# Patient Record
Sex: MALE | Race: WHITE | NOT HISPANIC OR LATINO | URBAN - METROPOLITAN AREA
[De-identification: names, ages, dates, MRNs, and addresses within clinical notes are randomized per-mention and may not be internally consistent; named-entity substitution may affect disease eponyms.]

---

## 2023-06-02 PROBLEM — M25.50 JOINT PAIN: Status: ACTIVE | Noted: 2023-06-02

## 2023-06-02 PROBLEM — D22.9 NEVUS: Status: ACTIVE | Noted: 2023-06-02

## 2023-06-02 PROBLEM — N52.9 ERECTILE DYSFUNCTION: Status: ACTIVE | Noted: 2023-06-02

## 2023-06-02 PROBLEM — L72.3 SEBACEOUS CYST: Status: ACTIVE | Noted: 2023-06-02

## 2023-06-02 PROBLEM — E78.5 HLD (HYPERLIPIDEMIA): Status: ACTIVE | Noted: 2023-06-02

## 2023-06-02 RX ORDER — SILDENAFIL 100 MG/1
TABLET, FILM COATED ORAL
COMMUNITY
Start: 2020-06-02 | End: 2023-06-05 | Stop reason: SDUPTHER

## 2023-06-05 ENCOUNTER — OFFICE VISIT (OUTPATIENT)
Dept: PRIMARY CARE | Facility: CLINIC | Age: 53
End: 2023-06-05
Payer: COMMERCIAL

## 2023-06-05 VITALS
SYSTOLIC BLOOD PRESSURE: 116 MMHG | DIASTOLIC BLOOD PRESSURE: 68 MMHG | TEMPERATURE: 97.5 F | WEIGHT: 184 LBS | RESPIRATION RATE: 16 BRPM | BODY MASS INDEX: 27.25 KG/M2 | HEART RATE: 72 BPM | HEIGHT: 69 IN

## 2023-06-05 DIAGNOSIS — Z00.00 ENCOUNTER FOR PREVENTIVE HEALTH EXAMINATION: Primary | ICD-10-CM

## 2023-06-05 DIAGNOSIS — N52.9 ERECTILE DYSFUNCTION, UNSPECIFIED ERECTILE DYSFUNCTION TYPE: ICD-10-CM

## 2023-06-05 DIAGNOSIS — E78.5 HYPERLIPIDEMIA, UNSPECIFIED HYPERLIPIDEMIA TYPE: ICD-10-CM

## 2023-06-05 DIAGNOSIS — Z12.5 PROSTATE CANCER SCREENING: ICD-10-CM

## 2023-06-05 PROCEDURE — 99396 PREV VISIT EST AGE 40-64: CPT | Performed by: FAMILY MEDICINE

## 2023-06-05 PROCEDURE — 90750 HZV VACC RECOMBINANT IM: CPT | Performed by: FAMILY MEDICINE

## 2023-06-05 PROCEDURE — 1036F TOBACCO NON-USER: CPT | Performed by: FAMILY MEDICINE

## 2023-06-05 PROCEDURE — 90471 IMMUNIZATION ADMIN: CPT | Performed by: FAMILY MEDICINE

## 2023-06-05 RX ORDER — SILDENAFIL 100 MG/1
100 TABLET, FILM COATED ORAL AS NEEDED
Qty: 30 TABLET | Refills: 3 | Status: SHIPPED | OUTPATIENT
Start: 2023-06-05 | End: 2023-06-09 | Stop reason: SDUPTHER

## 2023-06-05 ASSESSMENT — PROMIS GLOBAL HEALTH SCALE
RATE_SOCIAL_SATISFACTION: EXCELLENT
RATE_PHYSICAL_HEALTH: EXCELLENT
CARRYOUT_PHYSICAL_ACTIVITIES: COMPLETELY
RATE_MENTAL_HEALTH: EXCELLENT
RATE_QUALITY_OF_LIFE: EXCELLENT
RATE_GENERAL_HEALTH: EXCELLENT
RATE_AVERAGE_PAIN: 0
CARRYOUT_SOCIAL_ACTIVITIES: EXCELLENT
EMOTIONAL_PROBLEMS: NEVER

## 2023-06-05 ASSESSMENT — PATIENT HEALTH QUESTIONNAIRE - PHQ9
SUM OF ALL RESPONSES TO PHQ9 QUESTIONS 1 AND 2: 0
2. FEELING DOWN, DEPRESSED OR HOPELESS: NOT AT ALL
1. LITTLE INTEREST OR PLEASURE IN DOING THINGS: NOT AT ALL

## 2023-06-05 NOTE — PROGRESS NOTES
Keith Matos is a 52 y.o. male here today for   Chief Complaint   Patient presents with    Annual Exam        HPI   Patient is here for a periodic health exam.  I reviewed previous preventative health measures including screening tests, immunizations and labs.   Regular exercise.  No tobacco use.  ETOH - 1-2 drinks per week.    Surg Hx:  B knee arthroscopy.      Shigrix - never.  Tetanus last year.      Objective    Visit Vitals  /68   Pulse 72   Temp 36.4 °C (97.5 °F)   Resp 16     Body mass index is 27.17 kg/m².     Physical Exam  Vitals and nursing note reviewed.   Constitutional:       General: He is not in acute distress.     Appearance: Normal appearance.   HENT:      Head: Normocephalic and atraumatic.      Right Ear: Tympanic membrane, ear canal and external ear normal.      Left Ear: Tympanic membrane, ear canal and external ear normal.      Nose: Nose normal.      Mouth/Throat:      Mouth: Mucous membranes are moist.      Pharynx: Oropharynx is clear.   Eyes:      Extraocular Movements: Extraocular movements intact.      Conjunctiva/sclera: Conjunctivae normal.      Pupils: Pupils are equal, round, and reactive to light.   Cardiovascular:      Rate and Rhythm: Normal rate and regular rhythm.      Pulses: Normal pulses.      Heart sounds: Normal heart sounds. No murmur heard.     No friction rub. No gallop.   Pulmonary:      Effort: Pulmonary effort is normal. No respiratory distress.      Breath sounds: Normal breath sounds.   Abdominal:      General: Abdomen is flat. Bowel sounds are normal. There is no distension.      Palpations: Abdomen is soft.      Tenderness: There is no abdominal tenderness.   Musculoskeletal:         General: Normal range of motion.      Cervical back: Normal range of motion and neck supple.   Lymphadenopathy:      Cervical: No cervical adenopathy.   Skin:     General: Skin is warm and dry.      Findings: No lesion or rash.   Neurological:      General: No focal deficit  present.      Mental Status: He is alert. Mental status is at baseline.   Psychiatric:         Mood and Affect: Mood normal.         Behavior: Behavior normal.         Thought Content: Thought content normal.         Judgment: Judgment normal.            Assessment    1. Encounter for preventive health examination  Zoster vaccine, recombinant, adult (SHINGRIX), Comprehensive Metabolic Panel, CBC, Lipid Panel, Prostate Specific Antigen, Vitamin D, Total   Recommend regular exercise, balanced diet, regular dental exams, and healthy habits.  Appropriate labs ordered or reviewed.  His colon cancer screening is up-to-date.  He will receive Shingrix today and will set up a nurse visit in 2 to 6 months for the second immunization.  His other immunizations are up-to-date.  I recommend a yearly flu shot in the fall and I recommend a yearly wellness exam.     2. Erectile dysfunction, unspecified erectile dysfunction type  sildenafil (Viagra) 100 mg tablet   Condition well controlled.  No change in current treatment regimen.  Refill given of current medication.  Make a follow up appointment with me for recheck in 6 months.     3. Hyperlipidemia, unspecified hyperlipidemia type     Appropriate labs ordered or reviewed.     4. Prostate cancer screening  Prostate Specific Antigen   Appropriate labs ordered or reviewed.

## 2023-06-08 ENCOUNTER — LAB (OUTPATIENT)
Dept: LAB | Facility: LAB | Age: 53
End: 2023-06-08
Payer: COMMERCIAL

## 2023-06-08 ENCOUNTER — TELEPHONE (OUTPATIENT)
Dept: PRIMARY CARE | Facility: CLINIC | Age: 53
End: 2023-06-08

## 2023-06-08 DIAGNOSIS — E55.9 VITAMIN D DEFICIENCY: ICD-10-CM

## 2023-06-08 DIAGNOSIS — Z12.5 PROSTATE CANCER SCREENING: ICD-10-CM

## 2023-06-08 DIAGNOSIS — Z00.00 ENCOUNTER FOR PREVENTIVE HEALTH EXAMINATION: ICD-10-CM

## 2023-06-08 LAB
ALANINE AMINOTRANSFERASE (SGPT) (U/L) IN SER/PLAS: 13 U/L (ref 10–52)
ALBUMIN (G/DL) IN SER/PLAS: 4.5 G/DL (ref 3.4–5)
ALKALINE PHOSPHATASE (U/L) IN SER/PLAS: 55 U/L (ref 33–120)
ANION GAP IN SER/PLAS: 13 MMOL/L (ref 10–20)
ASPARTATE AMINOTRANSFERASE (SGOT) (U/L) IN SER/PLAS: 16 U/L (ref 9–39)
BILIRUBIN TOTAL (MG/DL) IN SER/PLAS: 0.7 MG/DL (ref 0–1.2)
CALCIDIOL (25 OH VITAMIN D3) (NG/ML) IN SER/PLAS: 21 NG/ML
CALCIUM (MG/DL) IN SER/PLAS: 9.2 MG/DL (ref 8.6–10.3)
CARBON DIOXIDE, TOTAL (MMOL/L) IN SER/PLAS: 28 MMOL/L (ref 21–32)
CHLORIDE (MMOL/L) IN SER/PLAS: 103 MMOL/L (ref 98–107)
CHOLESTEROL (MG/DL) IN SER/PLAS: 219 MG/DL (ref 0–199)
CHOLESTEROL IN HDL (MG/DL) IN SER/PLAS: 84.7 MG/DL
CHOLESTEROL/HDL RATIO: 2.6
CREATININE (MG/DL) IN SER/PLAS: 0.96 MG/DL (ref 0.5–1.3)
ERYTHROCYTE DISTRIBUTION WIDTH (RATIO) BY AUTOMATED COUNT: 11.9 % (ref 11.5–14.5)
ERYTHROCYTE MEAN CORPUSCULAR HEMOGLOBIN CONCENTRATION (G/DL) BY AUTOMATED: 34.3 G/DL (ref 32–36)
ERYTHROCYTE MEAN CORPUSCULAR VOLUME (FL) BY AUTOMATED COUNT: 94 FL (ref 80–100)
ERYTHROCYTES (10*6/UL) IN BLOOD BY AUTOMATED COUNT: 4.52 X10E12/L (ref 4.5–5.9)
GFR MALE: >90 ML/MIN/1.73M2
GLUCOSE (MG/DL) IN SER/PLAS: 84 MG/DL (ref 74–99)
HEMATOCRIT (%) IN BLOOD BY AUTOMATED COUNT: 42.3 % (ref 41–52)
HEMOGLOBIN (G/DL) IN BLOOD: 14.5 G/DL (ref 13.5–17.5)
LDL: 124 MG/DL (ref 0–99)
LEUKOCYTES (10*3/UL) IN BLOOD BY AUTOMATED COUNT: 3.8 X10E9/L (ref 4.4–11.3)
PLATELETS (10*3/UL) IN BLOOD AUTOMATED COUNT: 221 X10E9/L (ref 150–450)
POTASSIUM (MMOL/L) IN SER/PLAS: 4.5 MMOL/L (ref 3.5–5.3)
PROSTATE SPECIFIC AG (NG/ML) IN SER/PLAS: 2.61 NG/ML (ref 0–4)
PROTEIN TOTAL: 6.8 G/DL (ref 6.4–8.2)
SODIUM (MMOL/L) IN SER/PLAS: 139 MMOL/L (ref 136–145)
TRIGLYCERIDE (MG/DL) IN SER/PLAS: 53 MG/DL (ref 0–149)
UREA NITROGEN (MG/DL) IN SER/PLAS: 15 MG/DL (ref 6–23)
VLDL: 11 MG/DL (ref 0–40)

## 2023-06-08 PROCEDURE — 84153 ASSAY OF PSA TOTAL: CPT

## 2023-06-08 PROCEDURE — 85027 COMPLETE CBC AUTOMATED: CPT

## 2023-06-08 PROCEDURE — 80053 COMPREHEN METABOLIC PANEL: CPT

## 2023-06-08 PROCEDURE — 82306 VITAMIN D 25 HYDROXY: CPT

## 2023-06-08 PROCEDURE — 80061 LIPID PANEL: CPT

## 2023-06-08 PROCEDURE — 36415 COLL VENOUS BLD VENIPUNCTURE: CPT

## 2023-06-08 NOTE — TELEPHONE ENCOUNTER
"----- Message from Bang Zaragoza MD sent at 6/8/2023  2:40 PM EDT -----  Inform of low Vitamin D.  Take 2000 Units daily OTC.  Order Vitamin D 25 [place] in 2 months and send requisition to patient.  Add Dx of \"Vitamin D deficiency\" to dx list and add medications in chart.     Inform patient that cholesterol is slightly high currently.  But risk is not high enough to recommend medications.  I recommend low fat diet and lower calories, increased vegetables and fruits, 3 meals per day, healthy snacks, smaller portions.  Will monitor cholesterol over time.   Other labs were essentially normal.  "

## 2023-06-08 NOTE — RESULT ENCOUNTER NOTE
"Inform of low Vitamin D.  Take 2000 Units daily OTC.  Order Vitamin D 25 [place] in 2 months and send requisition to patient.  Add Dx of \"Vitamin D deficiency\" to dx list and add medications in chart.     Inform patient that cholesterol is slightly high currently.  But risk is not high enough to recommend medications.  I recommend low fat diet and lower calories, increased vegetables and fruits, 3 meals per day, healthy snacks, smaller portions.  Will monitor cholesterol over time.   Other labs were essentially normal."

## 2023-06-09 DIAGNOSIS — N52.9 ERECTILE DYSFUNCTION, UNSPECIFIED ERECTILE DYSFUNCTION TYPE: ICD-10-CM

## 2023-06-09 RX ORDER — SILDENAFIL 100 MG/1
100 TABLET, FILM COATED ORAL AS NEEDED
Qty: 30 TABLET | Refills: 3 | Status: SHIPPED | OUTPATIENT
Start: 2023-06-09

## 2023-10-02 ENCOUNTER — CLINICAL SUPPORT (OUTPATIENT)
Dept: PRIMARY CARE | Facility: CLINIC | Age: 53
End: 2023-10-02
Payer: COMMERCIAL

## 2023-10-02 DIAGNOSIS — Z23 NEED FOR VACCINATION: ICD-10-CM

## 2023-10-02 PROBLEM — E78.2 MIXED HYPERLIPIDEMIA: Status: ACTIVE | Noted: 2023-06-02

## 2023-10-02 PROCEDURE — 90471 IMMUNIZATION ADMIN: CPT | Performed by: FAMILY MEDICINE

## 2023-10-02 PROCEDURE — 90750 HZV VACC RECOMBINANT IM: CPT | Performed by: FAMILY MEDICINE

## 2023-10-02 NOTE — PROGRESS NOTES
Keith Matos is a 52 y.o. male here today for   Chief Complaint   Patient presents with    Immunizations        HPI         Current Outpatient Medications:     sildenafil (Viagra) 100 mg tablet, Take 1 tablet (100 mg) by mouth if needed for erectile dysfunction., Disp: 30 tablet, Rfl: 3    Patient Active Problem List   Diagnosis    Erectile dysfunction    Mixed hyperlipidemia    Joint pain    Nevus    Sebaceous cyst    Prostate cancer screening         No results found for this or any previous visit (from the past 672 hour(s)).     Objective    Visit Vitals  There were no vitals taken for this visit.    There is no height or weight on file to calculate BMI.     Physical Exam       Assessment    1. Need for vaccination  Zoster vaccine, recombinant, adult (SHINGRIX)

## 2024-06-06 ENCOUNTER — OFFICE VISIT (OUTPATIENT)
Dept: PRIMARY CARE | Facility: CLINIC | Age: 54
End: 2024-06-06
Payer: COMMERCIAL

## 2024-06-06 VITALS
DIASTOLIC BLOOD PRESSURE: 78 MMHG | HEIGHT: 69 IN | RESPIRATION RATE: 18 BRPM | HEART RATE: 82 BPM | WEIGHT: 180 LBS | SYSTOLIC BLOOD PRESSURE: 116 MMHG | TEMPERATURE: 97 F | BODY MASS INDEX: 26.66 KG/M2

## 2024-06-06 DIAGNOSIS — Z12.11 SCREENING FOR COLON CANCER: ICD-10-CM

## 2024-06-06 DIAGNOSIS — Z12.5 PROSTATE CANCER SCREENING: ICD-10-CM

## 2024-06-06 DIAGNOSIS — E78.2 MIXED HYPERLIPIDEMIA: ICD-10-CM

## 2024-06-06 DIAGNOSIS — Z00.00 ENCOUNTER FOR PREVENTIVE HEALTH EXAMINATION: ICD-10-CM

## 2024-06-06 DIAGNOSIS — M25.552 LEFT HIP PAIN: Primary | ICD-10-CM

## 2024-06-06 PROBLEM — M19.039 LOCALIZED PRIMARY OSTEOARTHRITIS OF WRIST: Status: ACTIVE | Noted: 2017-01-12

## 2024-06-06 PROBLEM — M23.90 DERANGEMENT OF KNEE: Status: ACTIVE | Noted: 2021-08-30

## 2024-06-06 PROBLEM — S83.249A TEAR OF MEDIAL MENISCUS OF KNEE: Status: ACTIVE | Noted: 2021-09-13

## 2024-06-06 PROCEDURE — 1036F TOBACCO NON-USER: CPT | Performed by: FAMILY MEDICINE

## 2024-06-06 PROCEDURE — 99212 OFFICE O/P EST SF 10 MIN: CPT | Performed by: FAMILY MEDICINE

## 2024-06-06 PROCEDURE — 99396 PREV VISIT EST AGE 40-64: CPT | Performed by: FAMILY MEDICINE

## 2024-06-06 NOTE — PROGRESS NOTES
Keith Matos is a 53 y.o. male here today a periodic health exam.  I reviewed previous preventative health measures including screening tests, immunizations and labs.      HPI   CURRENT COMPLAINTS OR CONCERNS:     Left hip pain since about 6 months ago.  Gradually worsening.    Over lateral hip only.  No injury or cause known.   Worse with excess walking, flexing hip, laying on hip.  He denies any anterior hip pain or pain over his buttock.  He does have normal range of motion.      PREVIOUS PREVENTATIVE HEALTH  Colonoscopy : NO  Cologuard : YES -- DATE 6/2021  - he would rather get a colonoscopy.    PSA : Yes 6/8/2023  Hepatitis C Antibody : No  HIV Screening : No  Prevnar : No  Tdap : Yes 5/31/2022  Shingrix : Yes 10/2/23 and 6/5/2023  Yearly Flu Shot : YES    CURRENT FINDINGS  Healthy Diet : YES  Exercise :  YES --  daily  Depression Issues : NO  Alcohol Use : YES --  3-4 per week  Tobacco Use : NO        Current Outpatient Medications:     sildenafil (Viagra) 100 mg tablet, Take 1 tablet (100 mg) by mouth if needed for erectile dysfunction., Disp: 30 tablet, Rfl: 3    History reviewed. No pertinent past medical history.    History reviewed. No pertinent surgical history.    No family history on file.    Social History     Tobacco Use    Smoking status: Never     Passive exposure: Never    Smokeless tobacco: Never   Substance Use Topics    Alcohol use: Yes    Drug use: Never       Immunization History   Administered Date(s) Administered    Flu vaccine (IIV4), preservative free *Check age/dose* 09/27/2021    Influenza, injectable, MDCK, quadrivalent 10/07/2022    Moderna COVID-19 vaccine, Fall 2023, 12 yeasrs and older (50mcg/0.5mL) 09/28/2023    Moderna COVID-19 vaccine, bivalent, blue cap/gray label *Check age/dose* 10/07/2022    Moderna SARS-CoV-2 Vaccination 03/11/2021, 04/08/2021, 10/25/2021, 04/15/2022    Tdap vaccine, age 7 year and older (BOOSTRIX, ADACEL) 11/07/2012, 05/31/2022    Zoster vaccine,  recombinant, adult (SHINGRIX) 06/05/2023, 10/02/2023         Objective    Visit Vitals  /78   Pulse 82   Temp 36.1 °C (97 °F)   Resp 18       Physical Exam  Vitals and nursing note reviewed.   Constitutional:       General: He is not in acute distress.     Appearance: Normal appearance.   HENT:      Head: Normocephalic and atraumatic.      Right Ear: Tympanic membrane, ear canal and external ear normal.      Left Ear: Tympanic membrane, ear canal and external ear normal.      Nose: Nose normal.      Mouth/Throat:      Mouth: Mucous membranes are moist.      Pharynx: Oropharynx is clear.   Eyes:      Extraocular Movements: Extraocular movements intact.      Conjunctiva/sclera: Conjunctivae normal.      Pupils: Pupils are equal, round, and reactive to light.   Cardiovascular:      Rate and Rhythm: Normal rate and regular rhythm.      Pulses: Normal pulses.      Heart sounds: Normal heart sounds. No murmur heard.     No friction rub. No gallop.   Pulmonary:      Effort: Pulmonary effort is normal. No respiratory distress.      Breath sounds: Normal breath sounds.   Abdominal:      General: Abdomen is flat. Bowel sounds are normal. There is no distension.      Palpations: Abdomen is soft.      Tenderness: There is no abdominal tenderness.   Musculoskeletal:         General: Normal range of motion.      Cervical back: Normal range of motion and neck supple.   Lymphadenopathy:      Cervical: No cervical adenopathy.   Skin:     General: Skin is warm and dry.      Findings: No lesion or rash.   Neurological:      General: No focal deficit present.      Mental Status: He is alert. Mental status is at baseline.   Psychiatric:         Mood and Affect: Mood normal.         Behavior: Behavior normal.         Thought Content: Thought content normal.         Judgment: Judgment normal.        Left hip-normal gait.  No anterior tenderness.  Really no significant tenderness over the lateral trochanteric bursa.    Assessment     1. Left hip pain  XR hip left with pelvis when performed 2 or 3 views   We will get a left hip x-ray for further evaluation.  We will call him with results and plan treatment and follow-up accordingly.     2. Screening for colon cancer  Colonoscopy Screening; Average Risk Patient      3. Encounter for preventive health examination  Comprehensive Metabolic Panel, CBC, Lipid Panel, Hepatitis C Antibody, HIV 1/2 Antigen/Antibody Screen with Reflex to Confirmation   Recommend regular exercise, balanced diet, regular dental exams, and healthy habits.  Appropriate labs ordered or reviewed.  I recommend to eat plenty of plant foods (such as whole-grain products, fruits, and vegetables) and a moderate amount of lean and low-fat, animal-based food (meat and dairy products).  When shopping, choose lean meats, fish, and poultry. I recommend to continue regular aerobic exercise.  I recommend a yearly flu shot in the fall and I recommend a yearly wellness exam.         4. Prostate cancer screening  Prostate Specific Antigen      5. Mixed hyperlipidemia  Comprehensive Metabolic Panel, Lipid Panel   Appropriate labs ordered or reviewed.

## 2024-06-10 DIAGNOSIS — Z12.11 SCREENING FOR COLON CANCER: ICD-10-CM

## 2024-06-10 RX ORDER — SODIUM, POTASSIUM,MAG SULFATES 17.5-3.13G
SOLUTION, RECONSTITUTED, ORAL ORAL
Qty: 354 ML | Refills: 0 | Status: SHIPPED | OUTPATIENT
Start: 2024-06-10

## 2024-06-12 ENCOUNTER — LAB (OUTPATIENT)
Dept: LAB | Facility: LAB | Age: 54
End: 2024-06-12
Payer: COMMERCIAL

## 2024-06-12 ENCOUNTER — HOSPITAL ENCOUNTER (OUTPATIENT)
Dept: RADIOLOGY | Facility: CLINIC | Age: 54
Discharge: HOME | End: 2024-06-12
Payer: COMMERCIAL

## 2024-06-12 DIAGNOSIS — Z12.5 PROSTATE CANCER SCREENING: ICD-10-CM

## 2024-06-12 DIAGNOSIS — E78.2 MIXED HYPERLIPIDEMIA: ICD-10-CM

## 2024-06-12 DIAGNOSIS — M25.552 LEFT HIP PAIN: ICD-10-CM

## 2024-06-12 DIAGNOSIS — Z00.00 ENCOUNTER FOR PREVENTIVE HEALTH EXAMINATION: ICD-10-CM

## 2024-06-12 LAB
ALBUMIN SERPL BCP-MCNC: 4.6 G/DL (ref 3.4–5)
ALP SERPL-CCNC: 49 U/L (ref 33–120)
ALT SERPL W P-5'-P-CCNC: 14 U/L (ref 10–52)
ANION GAP SERPL CALC-SCNC: 10 MMOL/L (ref 10–20)
AST SERPL W P-5'-P-CCNC: 17 U/L (ref 9–39)
BILIRUB SERPL-MCNC: 0.7 MG/DL (ref 0–1.2)
BUN SERPL-MCNC: 13 MG/DL (ref 6–23)
CALCIUM SERPL-MCNC: 9.2 MG/DL (ref 8.6–10.3)
CHLORIDE SERPL-SCNC: 106 MMOL/L (ref 98–107)
CHOLEST SERPL-MCNC: 224 MG/DL (ref 0–199)
CHOLESTEROL/HDL RATIO: 2.7
CO2 SERPL-SCNC: 30 MMOL/L (ref 21–32)
CREAT SERPL-MCNC: 0.91 MG/DL (ref 0.5–1.3)
EGFRCR SERPLBLD CKD-EPI 2021: >90 ML/MIN/1.73M*2
ERYTHROCYTE [DISTWIDTH] IN BLOOD BY AUTOMATED COUNT: 12.5 % (ref 11.5–14.5)
GLUCOSE SERPL-MCNC: 83 MG/DL (ref 74–99)
HCT VFR BLD AUTO: 42.7 % (ref 41–52)
HCV AB SER QL: NONREACTIVE
HDLC SERPL-MCNC: 84 MG/DL
HGB BLD-MCNC: 14.7 G/DL (ref 13.5–17.5)
HIV 1+2 AB+HIV1 P24 AG SERPL QL IA: NONREACTIVE
LDLC SERPL CALC-MCNC: 125 MG/DL
MCH RBC QN AUTO: 31.9 PG (ref 26–34)
MCHC RBC AUTO-ENTMCNC: 34.4 G/DL (ref 32–36)
MCV RBC AUTO: 93 FL (ref 80–100)
NON HDL CHOLESTEROL: 140 MG/DL (ref 0–149)
NRBC BLD-RTO: 0 /100 WBCS (ref 0–0)
PLATELET # BLD AUTO: 228 X10*3/UL (ref 150–450)
POTASSIUM SERPL-SCNC: 4.2 MMOL/L (ref 3.5–5.3)
PROT SERPL-MCNC: 6.8 G/DL (ref 6.4–8.2)
PSA SERPL-MCNC: 2.96 NG/ML
RBC # BLD AUTO: 4.61 X10*6/UL (ref 4.5–5.9)
SODIUM SERPL-SCNC: 142 MMOL/L (ref 136–145)
TRIGL SERPL-MCNC: 77 MG/DL (ref 0–149)
VLDL: 15 MG/DL (ref 0–40)
WBC # BLD AUTO: 5.3 X10*3/UL (ref 4.4–11.3)

## 2024-06-12 PROCEDURE — 36415 COLL VENOUS BLD VENIPUNCTURE: CPT

## 2024-06-12 PROCEDURE — 86803 HEPATITIS C AB TEST: CPT

## 2024-06-12 PROCEDURE — 80061 LIPID PANEL: CPT

## 2024-06-12 PROCEDURE — 80053 COMPREHEN METABOLIC PANEL: CPT

## 2024-06-12 PROCEDURE — 85027 COMPLETE CBC AUTOMATED: CPT

## 2024-06-12 PROCEDURE — 84153 ASSAY OF PSA TOTAL: CPT

## 2024-06-12 PROCEDURE — 73502 X-RAY EXAM HIP UNI 2-3 VIEWS: CPT | Mod: LT

## 2024-06-12 PROCEDURE — 87389 HIV-1 AG W/HIV-1&-2 AB AG IA: CPT

## 2024-06-12 PROCEDURE — 73502 X-RAY EXAM HIP UNI 2-3 VIEWS: CPT | Mod: LEFT SIDE | Performed by: RADIOLOGY

## 2024-06-13 ENCOUNTER — TELEPHONE (OUTPATIENT)
Dept: PRIMARY CARE | Facility: CLINIC | Age: 54
End: 2024-06-13
Payer: COMMERCIAL

## 2024-06-13 DIAGNOSIS — M16.10 ARTHRITIS OF HIP: ICD-10-CM

## 2024-06-13 NOTE — RESULT ENCOUNTER NOTE
Inform patient that cholesterol is slightly high currently.  But risk is not high enough to recommend medications.  I recommend low fat diet and lower calories, increased vegetables and fruits, 3 meals per day, healthy snacks, smaller portions.  Will monitor cholesterol over time.  The remainder of his labs were all essentially normal.

## 2024-06-13 NOTE — TELEPHONE ENCOUNTER
----- Message from Bang Zaragoza MD sent at 6/13/2024  2:57 PM EDT -----  Please inform the patient that his hip x-ray showed that he has moderate degenerative arthritis of the left hip.  If it is still bothering him I would recommend that he see an orthopedic doctor for further evaluation.  Please place a referral to Dr. Dailey and add left hip arthritis to his diagnosis list.

## 2024-06-13 NOTE — RESULT ENCOUNTER NOTE
Please inform the patient that his hip x-ray showed that he has moderate degenerative arthritis of the left hip.  If it is still bothering him I would recommend that he see an orthopedic doctor for further evaluation.  Please place a referral to Dr. Dailey and add left hip arthritis to his diagnosis list.

## 2024-06-18 ENCOUNTER — OFFICE VISIT (OUTPATIENT)
Dept: ORTHOPEDIC SURGERY | Facility: CLINIC | Age: 54
End: 2024-06-18
Payer: COMMERCIAL

## 2024-06-18 DIAGNOSIS — M16.11 PRIMARY OSTEOARTHRITIS OF RIGHT HIP: ICD-10-CM

## 2024-06-18 DIAGNOSIS — M53.3 SI (SACROILIAC) JOINT DYSFUNCTION: Primary | ICD-10-CM

## 2024-06-18 PROCEDURE — 99204 OFFICE O/P NEW MOD 45 MIN: CPT | Performed by: ORTHOPAEDIC SURGERY

## 2024-06-18 PROCEDURE — 99213 OFFICE O/P EST LOW 20 MIN: CPT | Performed by: ORTHOPAEDIC SURGERY

## 2024-06-18 RX ORDER — MELOXICAM 15 MG/1
15 TABLET ORAL
Qty: 30 TABLET | Refills: 0 | Status: SHIPPED | OUTPATIENT
Start: 2024-06-18

## 2024-06-18 NOTE — PROGRESS NOTES
Subjective    Patient ID: Keith    Chief Complaint:   Chief Complaint   Patient presents with    Left Hip - Pain, New Patient Visit     Referred by Bang Zaragoza  Xrays @  06/12/24     53-year-old gentleman new patient to Dr. Dailey presenting with left hip pain.  He states over the last 10 to 11 years she has had intermittent left hip pain getting worse over the last year or so.  No injury that he can recall.  He has been under treatment previously with Dr. Decker for bilateral knee osteoarthritis where he is undergone bilateral knee arthroscopies.  He is continuing to have mild left groin pain.  He is very active.  He tries to workout most days of the week specifically riding the bicycle.  No instability reported.  No locking catching reported.  No numbness or tingling.  He has tried over-the-counter anti-inflammatory medications and analgesics with minimal results.        Past medical history        The patient's past medical history, family history, social history, and review of systems were documented on the patient's medical intake form.  The medical intake form was reviewed and scanned into the electronic medical record for future use.  History is otherwise negative except as stated in the HPI.     Physical exam     General: Alert and oriented to place, person, and time.  No acute distress and breathing comfortably; pleasant and cooperative with the examination.  HEENT: Head is normocephalic and atraumatic.  Neck: Supple, no visible swelling.  Cardiovascular: Good perfusion to the affected extremity.  Lungs: No audible wheezing or labored breathing.  Abdomen: Nondistended     Extremity:  Left hip is neurovascular intact.  Good range of motion today.  Groin pain and tightness/pressure with passive range of motion left hip.  Negative straight leg raise test.  2+ pulses bilateral lower extremity.  Capillary refill is within normal is distally.  Compartment soft.  Mild tenderness palpation over the bursa  region laterally.     Diagnostics:  X-rays from 6/12/2024 reviewed and show moderate grade osteoarthritis left hip     Procedures  [none   ]     Assessment:  Left hip osteoarthritis     Treatment plan:  1.  At this time I do believe most of his symptoms are coming from his osteoarthritis as well as some of his SI joint dysfunction due to his gait abnormality.  2.  We do long discussion today in regards to continued conservative management.  He will continue with exercise routine.  Prescription for outpatient physical therapy was given to patient for at least 1 visit at PT to show him the exercises specific to pelvic stability and core strengthening.  3.  Prescription for meloxicam 15 mg 1 p.o. daily sent to the pharmacy.  He will continue with weightbearing activity as tolerated follow-up with us in 6 weeks without x-ray.  For complete plan and/or surgical details, please refer to Dr. Dailey's portion of the split/shared dictation.  4.  All of the patient's questions were answered.     This note was prepared using voice recognition software.  The details of this note are correct and have been reviewed, and corrected to the best of my ability.  Some grammatical areas may persist related to the Dragon software     Ruddy Boyer PA-C, [facility]  [facility]     [phone]    In a face-to-face encounter performed today, I Chemo Dailey MD performed a history and physical examination, discussed pertinent diagnostic studies if indicated, and discussed diagnosis and management strategies with both the patient and the midlevel provider.  I reviewed the midlevel's note and agree with the documented findings and plan of care.  Greater than 50% of the evaluation and treatment decision was performed by the physician myself during today's visit.    New patient me 53-year-old gentleman presents complaint of left-sided hip pain states he had intermittent trouble with his left hip for the past  several years but getting worse he is very active he has history of bilateral knee scopes previously he knows he has arthritis in his knees but hip pain is getting progressively worse he hurts him in the groin sometimes in the posterior aspect of his hip is worse with activity better with rest he is tried some over-the-counter medications without significant improvement.  On examination is got moderate pain with internal ex rotation of the hip neurologically intact distally brisk cap refill x-rays are reviewed from June 12, 2024 through the  PACS system impression is early arthritis left hip.  Plan is meloxicam which is sent to pharmacy.  Long-term use of nonsteroidal anti-inflammatory drugs was discussed as part of the treatment plan.  We discussed that these may result in GI upset and/or bleeding.  Any stomach pain or dark stools would be reason to discontinue use.  We discussed that when used over long-term these medications can result in altered renal or hepatic function and it used to be on 3 months requires follow-up with her primary provider for monitoring.  Patient expressed understanding and agree with this plan.  He is also currently 1 visit with physical therapy to work on core strengthening and pelvic stability and to follow-up with me in about 6 weeks if he is not at that time we will consider an intra-articular hip injection.      Patient has severe impairment related to her presenting condition.  It is significantly impairing bodily function.  We did discuss surgical and nonsurgical options.    This note was prepared using voice recognition software.  The details of this note are correct and have been reviewed, and corrected to the best of my ability.  Some grammatical areas may persist related to the Dragon software    Chemo Dailey MD  Senior Attending Physician  [facility]    [phone]

## 2024-07-01 ENCOUNTER — ANESTHESIA EVENT (OUTPATIENT)
Dept: GASTROENTEROLOGY | Facility: HOSPITAL | Age: 54
End: 2024-07-01
Payer: COMMERCIAL

## 2024-07-01 ENCOUNTER — ANESTHESIA (OUTPATIENT)
Dept: GASTROENTEROLOGY | Facility: HOSPITAL | Age: 54
End: 2024-07-01
Payer: COMMERCIAL

## 2024-07-01 ENCOUNTER — HOSPITAL ENCOUNTER (OUTPATIENT)
Dept: GASTROENTEROLOGY | Facility: HOSPITAL | Age: 54
Setting detail: OUTPATIENT SURGERY
Discharge: HOME | End: 2024-07-01
Payer: COMMERCIAL

## 2024-07-01 VITALS
TEMPERATURE: 96.8 F | OXYGEN SATURATION: 95 % | HEART RATE: 60 BPM | BODY MASS INDEX: 27.4 KG/M2 | RESPIRATION RATE: 16 BRPM | SYSTOLIC BLOOD PRESSURE: 120 MMHG | HEIGHT: 69 IN | WEIGHT: 185 LBS | DIASTOLIC BLOOD PRESSURE: 70 MMHG

## 2024-07-01 DIAGNOSIS — Z12.11 SCREENING FOR COLON CANCER: ICD-10-CM

## 2024-07-01 PROCEDURE — 3700000001 HC GENERAL ANESTHESIA TIME - INITIAL BASE CHARGE

## 2024-07-01 PROCEDURE — 2500000005 HC RX 250 GENERAL PHARMACY W/O HCPCS: Performed by: ANESTHESIOLOGIST ASSISTANT

## 2024-07-01 PROCEDURE — A45378 PR COLONOSCOPY,DIAGNOSTIC: Performed by: ANESTHESIOLOGY

## 2024-07-01 PROCEDURE — A45378 PR COLONOSCOPY,DIAGNOSTIC: Performed by: ANESTHESIOLOGIST ASSISTANT

## 2024-07-01 PROCEDURE — 7100000010 HC PHASE TWO TIME - EACH INCREMENTAL 1 MINUTE

## 2024-07-01 PROCEDURE — 7100000009 HC PHASE TWO TIME - INITIAL BASE CHARGE

## 2024-07-01 PROCEDURE — 45385 COLONOSCOPY W/LESION REMOVAL: CPT | Performed by: STUDENT IN AN ORGANIZED HEALTH CARE EDUCATION/TRAINING PROGRAM

## 2024-07-01 PROCEDURE — 2500000004 HC RX 250 GENERAL PHARMACY W/ HCPCS (ALT 636 FOR OP/ED): Performed by: ANESTHESIOLOGIST ASSISTANT

## 2024-07-01 PROCEDURE — 3700000002 HC GENERAL ANESTHESIA TIME - EACH INCREMENTAL 1 MINUTE

## 2024-07-01 RX ORDER — PROPOFOL 10 MG/ML
INJECTION, EMULSION INTRAVENOUS CONTINUOUS PRN
Status: DISCONTINUED | OUTPATIENT
Start: 2024-07-01 | End: 2024-07-01

## 2024-07-01 RX ORDER — LIDOCAINE HYDROCHLORIDE 20 MG/ML
INJECTION, SOLUTION INFILTRATION; PERINEURAL AS NEEDED
Status: DISCONTINUED | OUTPATIENT
Start: 2024-07-01 | End: 2024-07-01

## 2024-07-01 SDOH — HEALTH STABILITY: MENTAL HEALTH: CURRENT SMOKER: 0

## 2024-07-01 ASSESSMENT — PAIN - FUNCTIONAL ASSESSMENT
PAIN_FUNCTIONAL_ASSESSMENT: 0-10

## 2024-07-01 ASSESSMENT — PAIN SCALES - GENERAL
PAINLEVEL_OUTOF10: 0 - NO PAIN

## 2024-07-01 NOTE — H&P
Procedure H&P    Patient Profile-Procedures  Name Keith Matos  Date of Birth 1970  MRN [id number]  Address   [address] [place] [id number] [address] [place] [address]    Primary Phone Number [phone]  Secondary Phone Number    Bang Gamino    Procedure(s):  Procedures: Colonoscopy  Primary contact name and number   Extended Emergency Contact Information  Primary Emergency Contact: Anai Matos  Home Phone: [phone]  Relation: Spouse    General Health  Weight There were no vitals filed for this visit.  BMI There is no height or weight on file to calculate BMI.    Allergies  No Known Allergies    Past Medical History   History reviewed. No pertinent past medical history.    Provider assessment  Diagnosis: Colon Cancer Screening/Surveillance   Medication Reviewed - yes  Prior to Admission medications    Medication Sig Start Date End Date Taking? Authorizing Provider   meloxicam (Mobic) 15 mg tablet Take 1 tablet (15 mg) by mouth once daily with breakfast. 6/18/24   Chemo Dailey MD   sildenafil (Viagra) 100 mg tablet Take 1 tablet (100 mg) by mouth if needed for erectile dysfunction. 6/9/23   Bang Zaragoza MD   sodium,potassium,mag sulfates (Suprep) 17.5-3.13-1.6 gram recon soln solution Take one bottle twice as directed by the prep instructions 6/10/24   Lino Pantoja MD       Physical Exam  There were no vitals filed for this visit.     General: A&Ox3, NAD.  HEENT: AT/NC.   CV: RRR. No murmur.  Resp: CTA bilaterally. No wheezing, rhonchi or rales.   GI: Soft, NT/ND. BSx4.  Extrem: No edema. Pulses intact.  Neuro: No focal deficits.   Psych: Normal mood and affect.      Procedure Plan - pre-procedural (re)assesment completed by physician:  discharge/transfer patient when discharge criteria met    Lino Pantoja MD  7/1/2024 9:14 AM

## 2024-07-01 NOTE — ANESTHESIA PREPROCEDURE EVALUATION
Patient: Keith Matos    Procedure Information       Date/Time: 07/01/24 0950    Scheduled providers: Lino Pantoja MD    Procedure: COLONOSCOPY    Location: [facility]            Relevant Problems   Cardiac   (+) Mixed hyperlipidemia      Musculoskeletal   (+) Localized primary osteoarthritis of wrist       Clinical information reviewed:   Tobacco  Allergies  Meds   Med Hx  Surg Hx   Fam Hx  Soc Hx        NPO Detail:  No data recorded     Physical Exam    Airway  Mallampati: II  Neck ROM: limited     Cardiovascular   Rhythm: regular  Rate: normal     Dental - normal exam     Pulmonary   Breath sounds clear to auscultation     Abdominal            Anesthesia Plan    History of general anesthesia?: yes  History of complications of general anesthesia?: no    ASA 2     general     The patient is not a current smoker.    intravenous induction   Anesthetic plan and risks discussed with patient.    Plan discussed with [facility].

## 2024-07-01 NOTE — ANESTHESIA PROCEDURE NOTES
Airway  Date/Time: 7/1/2024 9:52 AM  Urgency: elective    Airway not difficult    Staffing  Performed: [facility]   Authorized by: Jan Claire MD    Performed by: Jan Claire MD  Patient location during procedure: OR    Indications and Patient Condition  Indications for airway management: anesthesia  Spontaneous ventilation: present  Sedation level: moderate (conscious sedation)  Preoxygenated: yes      Final Airway Details  Final airway type: mask

## 2024-07-01 NOTE — ANESTHESIA POSTPROCEDURE EVALUATION
Patient: Keith Matos    Procedure Summary       Date: 07/01/24 Room / Location: [facility]    Anesthesia Start: 0948 Anesthesia Stop: 1017    Procedure: COLONOSCOPY Diagnosis: Screening for colon cancer    Scheduled Providers: Lino Pantoja MD Responsible Provider: Jan Claire MD    Anesthesia Type: general ASA Status: 2            Anesthesia Type: general    Vitals Value Taken Time   /72 07/01/24 1017   Temp 36.3 07/01/24 1017   Pulse 50 07/01/24 1017   Resp 14 07/01/24 1017   SpO2 97 07/01/24 1017       Anesthesia Post Evaluation    Patient location during evaluation: bedside  Patient participation: complete - patient participated  Level of consciousness: awake  Pain management: adequate  Airway patency: patent  Cardiovascular status: acceptable  Respiratory status: acceptable  Hydration status: acceptable  Postoperative Nausea and Vomiting: none      No notable events documented.

## 2024-07-01 NOTE — DISCHARGE INSTRUCTIONS
Patient Instructions after an Endoscopy      Post-procedure recommendations:  - The patient will be observed post-procedure, until all discharge criteria are met.   - Discharge the patient to home with family member/escort.  - Resume previous diet.  - Continue present medications.  - Observe patient's clinical course following today's procedure with therapeutic intervention.   - Watch for bleeding, perforation, and infection.   - Follow-up with referring physician.   - Return to primary care physician.  - The patient has a contact number available for  emergencies.  The signs and symptoms of potential delayed complications were discussed with the patient.  Return to normal activities tomorrow.  Written discharge instructions were provided to the patient.    The anesthetics, sedatives or narcotics which were given to you today will be acting in your body for the next 24 hours, so you might feel a little sleepy or groggy.  This feeling should slowly wear off. Carefully read and follow the instructions.     You received sedation today:  - Do not drive or operate any machinery or power tools of any kind.   - No alcoholic beverages today, not even beer or wine.  - Do not make any important decisions or sign any legal documents.  - No over the counter medications that contain alcohol or that may cause drowsiness.  - Do not make any important decisions or sign any legal documents.    While it is common to experience mild to moderate abdominal distention, gas, or belching after your procedure, if any of these symptoms occur following discharge from the GI Lab or within one week of having your procedure, call the [facility] to be advised whether a visit to your nearest Urgent Care or Emergency Department is indicated.  Take this paper with you if you go:  - If you develop an allergic reaction to the medications that were given during your procedure such as difficulty breathing, rash, hives, severe nausea,  vomiting or lightheadedness.  - If you experience chest pain, shortness of breath, severe abdominal pain, fevers and chills.  - If you develop signs and symptoms of bleeding such as blood in your spit, if your stools turn black, tarry, or bloody.  - If you have not urinated within 8 hours following your procedure.  - If your IV site becomes painful, red, inflamed, or looks infected.       If you experience any problems or have any questions following discharge from the GI Lab, please call: [phone]

## 2024-07-11 LAB
LABORATORY COMMENT REPORT: NORMAL
PATH REPORT.FINAL DX SPEC: NORMAL
PATH REPORT.GROSS SPEC: NORMAL
PATH REPORT.TOTAL CANCER: NORMAL

## 2024-07-30 ENCOUNTER — OFFICE VISIT (OUTPATIENT)
Dept: ORTHOPEDIC SURGERY | Facility: CLINIC | Age: 54
End: 2024-07-30
Payer: COMMERCIAL

## 2024-07-30 DIAGNOSIS — M16.12 PRIMARY OSTEOARTHRITIS OF LEFT HIP: Primary | ICD-10-CM

## 2024-07-30 PROCEDURE — 1036F TOBACCO NON-USER: CPT | Performed by: ORTHOPAEDIC SURGERY

## 2024-07-30 PROCEDURE — 99213 OFFICE O/P EST LOW 20 MIN: CPT | Performed by: ORTHOPAEDIC SURGERY

## 2024-07-31 NOTE — PROGRESS NOTES
History of present illness    53-year-old male here for follow-up of his left hip still noticing decreased range of motion difficulty with certain activities he has been working on physical therapy and feels like he is stronger but is pain and mobility has not really increased denies any numbness or tingling most of his pain is in his groin      Past medical , Surgical, Family and social history reviewed.      Physical exam  General: No acute distress and breathing comfortably.  Patient is pleasant and cooperative with the examination.    Extremity  The affected hip was examined and inspected and was tender to touch along the groin and lateral bursal area.  The hip joint occasionally displayed catching, locking, and mechanical symptoms.  The skin was intact without breakdown or open wounds.  There was some mild crepitus seen with hip internal and external range of motion without evidence of instability.  Inspection of the low back showed normal curvature and integrity of the skin.  Strength and stability of the low back muscles and ligaments were within normal limits.  There was a negative straight leg raise test with no foot drop, numbness, or tingling in both lower extremities.  Sensation, reflexes, and pulses in the foot and ankle are preserved.  There was no obvious effusion.  Range of motion showed flexion and internal and external rotation were all limited with pain.  The patient had the ability to bear weight, but with discomfort.  The patient's gait was antalgic secondary to the discomfort.    Diagnostics      Colonoscopy Screening; Average Risk Patient    Result Date: 7/1/2024  Table formatting from the original result was not included. Impression The terminal ileum appeared normal. Diverticulosis of mild severity in the sigmoid colon Polyp in the rectum was removed with cold snare Findings The terminal ileum appeared normal. Few small diverticula of mild severity in the sigmoid colon  Sessile, hyperplastic polyp in the rectum; no bleeding was identified; performed cold snare with complete en bloc removal and retrieved specimen  Recommendation  Await pathology results  Repeat screening colonoscopy in 10 years Indication Screening for colon cancer Staff Staff Role Lino Pantoja MD Proceduralist Medications See Anesthesia Record. Preprocedure A history and physical has been performed, and patient medication allergies have been reviewed. The patient's tolerance of previous anesthesia has been reviewed. The risks and benefits of the procedure and the sedation options and risks were discussed with the patient. All questions were answered and informed consent obtained. Details of the Procedure The patient underwent monitored anesthesia care, which was administered by an anesthesia professional. The patient's blood pressure, ECG, ETCO2, heart rate, level of consciousness, oxygen and respirations were monitored throughout the procedure. A digital rectal exam was performed. The scope was introduced through the anus and advanced to the terminal ileum. Retroflexion was performed in the rectum. The quality of bowel preparation was evaluated using the [place] Bowel Preparation Scale with scores of: right colon = 3, transverse colon = 3, left colon = 3. The total BBPS score was 9. Bowel prep was adequate. The patient experienced no blood loss. The procedure was not difficult. The patient tolerated the procedure well. There were no apparent adverse events. Events Procedure Events Event Event Time ENDO SCOPE IN TIME 7/1/2024  9:55 AM ENDO CECUM REACHED 7/1/2024 10:00 AM ENDO SCOPE OUT TIME 7/1/2024 10:08 AM Specimens ID Type Source Tests Collected by Time 1 :  Tissue RECTUM POLYPECTOMY SURGICAL PATHOLOGY EXAM Linda Bañuelos 7/1/2024 1006 Procedure Location [facility] [address] [facility] [address] [phone] Referring Provider Bang Zaragoza MD Procedure Provider  Lino Pantoja MD        Procedure  [ none]    Assessment  Left hip arthritis    Treatment plan  1.  The natural history of the condition and its associated treatment alternatives including surgical and nonsurgical options were discussed with the patient at length.  2.  Patient not with any real significant improvement with medication and physical therapy going to try a fluoro-guided steroid injection left hip the procedures risk and benefits were discussed with him he understands and wishes to proceed.  Patient understands the cortisone may provide temporary relief how much it helps her how long it lasts is unpredictable.  Cortisone can be repeated after 3 months if symptoms return.  3. [   ]  4.  All of the patient's questions were answered.    Orders Placed This Encounter    FL guided aspiration or injection large joint left       This note was prepared using voice recognition software.  The details of this note are correct and have been reviewed, and corrected to the best of my ability.  Some grammatical areas may persist related to the Dragon software    Chemo Dailey MD  Senior Attending Physician  [facility]  [facility] [place]    [phone]

## 2024-08-28 ENCOUNTER — HOSPITAL ENCOUNTER (OUTPATIENT)
Dept: RADIOLOGY | Facility: HOSPITAL | Age: 54
Discharge: HOME | End: 2024-08-28
Payer: COMMERCIAL

## 2024-08-28 DIAGNOSIS — M16.12 PRIMARY OSTEOARTHRITIS OF LEFT HIP: ICD-10-CM

## 2024-08-28 PROCEDURE — 77002 NEEDLE LOCALIZATION BY XRAY: CPT | Mod: LT

## 2024-08-28 PROCEDURE — 2500000005 HC RX 250 GENERAL PHARMACY W/O HCPCS: Performed by: ORTHOPAEDIC SURGERY

## 2024-08-28 PROCEDURE — 2500000004 HC RX 250 GENERAL PHARMACY W/ HCPCS (ALT 636 FOR OP/ED): Performed by: ORTHOPAEDIC SURGERY

## 2024-08-28 PROCEDURE — 2550000001 HC RX 255 CONTRASTS: Performed by: ORTHOPAEDIC SURGERY

## 2024-08-28 RX ORDER — BUPIVACAINE HYDROCHLORIDE 5 MG/ML
INJECTION, SOLUTION EPIDURAL; INTRACAUDAL AS NEEDED
Status: COMPLETED | OUTPATIENT
Start: 2024-08-28 | End: 2024-08-28

## 2024-08-28 RX ORDER — METHYLPREDNISOLONE ACETATE 40 MG/ML
INJECTION, SUSPENSION INTRA-ARTICULAR; INTRALESIONAL; INTRAMUSCULAR; SOFT TISSUE AS NEEDED
Status: COMPLETED | OUTPATIENT
Start: 2024-08-28 | End: 2024-08-28

## 2024-08-28 RX ORDER — LIDOCAINE HYDROCHLORIDE 20 MG/ML
INJECTION, SOLUTION EPIDURAL; INFILTRATION; INTRACAUDAL; PERINEURAL AS NEEDED
Status: COMPLETED | OUTPATIENT
Start: 2024-08-28 | End: 2024-08-28

## 2024-10-04 ENCOUNTER — OFFICE VISIT (OUTPATIENT)
Dept: ORTHOPEDIC SURGERY | Facility: CLINIC | Age: 54
End: 2024-10-04
Payer: COMMERCIAL

## 2024-10-04 DIAGNOSIS — M16.12 PRIMARY OSTEOARTHRITIS OF LEFT HIP: Primary | ICD-10-CM

## 2024-10-04 PROCEDURE — 99214 OFFICE O/P EST MOD 30 MIN: CPT | Performed by: ORTHOPAEDIC SURGERY

## 2024-10-04 NOTE — PROGRESS NOTES
Subjective    Patient ID: Keith    Chief Complaint:   Chief Complaint   Patient presents with    Left Hip - Osteoarthritis, Injection Follow-up     S/P: fluoro guided injection 8/28/24     History of present illness    53-year-old gentleman presented clinic today for follow-up evaluation left hip osteoarthritis.  He had a fluoroscopy guided steroid injection back on August 28, 2024.  He states that the injection helped him for 2 days.  He is continuing to have decreased range of motion as well as increased pain with weightbearing activity of the left hip.  He has been contemplating surgery on the left hip and is ready to commit.  Ambulating without assistance.  Reports no instability or numbness or tingling.      Past medical , Surgical, Family and social history reviewed.      Physical exam  General: No acute distress and breathing comfortably.  Patient is pleasant and cooperative with the examination.    Extremity  Left hip is neurovascular intact.  The affected hip was examined and inspected and was tender to touch along the groin and lateral bursal area.  The hip joint occasionally displayed catching, locking, and mechanical symptoms.  The skin was intact without breakdown or open wounds.  There was some mild crepitus seen with hip internal and external range of motion without evidence of instability.  Inspection of the low back showed normal curvature and integrity of the skin.  Strength and stability of the low back muscles and ligaments were within normal limits.  There was a negative straight leg raise test with no foot drop, numbness, or tingling in both lower extremities.  Sensation, reflexes, and pulses in the foot and ankle are preserved.  There was no obvious effusion.  Range of motion showed flexion and internal and external rotation were all limited with pain.  The patient had the ability to bear weight, but with discomfort.  The patient's gait was antalgic secondary to the  discomfort.    Diagnostics  [ none]  No results found.     Procedure  [ none]    Assessment  Left hip osteoarthritis    Treatment plan  1.  At this time we a long discussion regards to conservative or surgical intervention the risks and benefits of surgery were discussed with patient in regards to a left total hip replacement.  2.  He wishes to proceed with a left total hip replacement at his earliest convenience.  It has to be no sooner than 11/28/2024 since he received his cortisone injection in the left hip on 8/28/2024.  3.  We will follow-up with him just prior to surgery at his preop appointment.  For complete plan and/or surgical details, please refer to Dr. Dailey's portion of the split/shared dictation.  4.  All of the patient's questions were answered.    Patient has failed all forms of conservative treatment. The joint pain is significantly affecting quality of life and activities of daily living.   The patient has pain in the joint that is increased with activity and weightbearing, and walks with an antalgic gait. The pain is interfering with activities of daily living. Patient has limited range of motion and pain with passive range of motion. X-rays demonstrate joint space narrowing, subchondral sclerosis and osteophyte formation. Symptoms are not improving with medication, physical therapy or supportive device for a period of at least 3 months.      Patient would like to go and schedule joint replacement surgery. The procedure its risks, benefits, and treatment alternatives were discussed at length and patient would like to proceed. Patient is going to schedule the procedure at their earliest convenience and see me back for a preop visit just prior. Preadmission testing, medical checkup, and insurance authorization will be performed in the meantime. Patient understands a joint replacement surgery is a last resort, although a very successful operation results are not guaranteed.      No orders of the  defined types were placed in this encounter.      This note was prepared using voice recognition software.  The details of this note are correct and have been reviewed, and corrected to the best of my ability.  Some grammatical areas may persist related to the Dragon software    Ruddy Boyer PA-C, [facility]  [facility] [place]    [phone]    In a face-to-face encounter performed today, I Chemo Dailey MD performed a history and physical examination, discussed pertinent diagnostic studies if indicated, and discussed diagnosis and management strategies with both the patient and the midlevel provider.  I reviewed the midlevel's note and agree with the documented findings and plan of care.  Greater than 50% of the evaluation and treatment decision was performed by the physician myself during today's visit.    53-year-old male chronic left-sided hip pain and had previous fluoroscopy guided injection in August states it only helped for a day or 2 he realized what it feels like not to have hip hip pain.  He understands now that he is having severe impairment of bodily function related to his hip arthritis.  He is tried extensive conservative treatment on examination he has pain with internal ex rotation is neurologically intact.  Impression is  left hip arthritis.  Plan is left total hip replacement the procedures risk benefits treatment alternatives and postoperative course were discussed with him he understands and wishes to proceed.      Patient has severe impairment related to her presenting condition.  It is significantly impairing bodily function.  We did discuss surgical and nonsurgical options.    This note was prepared using voice recognition software.  The details of this note are correct and have been reviewed, and corrected to the best of my ability.  Some grammatical areas may persist related to the Dragon software    Chemo Dailey MD  Senior Attending  [facility]    [phone]

## 2024-11-12 ASSESSMENT — PROMIS GLOBAL HEALTH SCALE
RATE_GENERAL_HEALTH: EXCELLENT
RATE_QUALITY_OF_LIFE: EXCELLENT
EMOTIONAL_PROBLEMS: NEVER
RATE_MENTAL_HEALTH: EXCELLENT
CARRYOUT_SOCIAL_ACTIVITIES: EXCELLENT
RATE_PHYSICAL_HEALTH: EXCELLENT
RATE_AVERAGE_PAIN: 3
RATE_SOCIAL_SATISFACTION: EXCELLENT
CARRYOUT_PHYSICAL_ACTIVITIES: MOSTLY

## 2024-11-13 ENCOUNTER — LAB (OUTPATIENT)
Dept: LAB | Facility: LAB | Age: 54
End: 2024-11-13
Payer: COMMERCIAL

## 2024-11-13 ENCOUNTER — HOSPITAL ENCOUNTER (OUTPATIENT)
Dept: CARDIOLOGY | Facility: HOSPITAL | Age: 54
Discharge: HOME | End: 2024-11-13
Payer: COMMERCIAL

## 2024-11-13 DIAGNOSIS — M16.12 UNILATERAL PRIMARY OSTEOARTHRITIS, LEFT HIP: ICD-10-CM

## 2024-11-13 LAB
ALBUMIN SERPL BCP-MCNC: 4.7 G/DL (ref 3.4–5)
ALP SERPL-CCNC: 45 U/L (ref 33–120)
ALT SERPL W P-5'-P-CCNC: 15 U/L (ref 10–52)
ANION GAP SERPL CALC-SCNC: 12 MMOL/L (ref 10–20)
AST SERPL W P-5'-P-CCNC: 21 U/L (ref 9–39)
ATRIAL RATE: 58 BPM
BASOPHILS # BLD AUTO: 0.02 X10*3/UL (ref 0–0.1)
BASOPHILS NFR BLD AUTO: 0.4 %
BILIRUB SERPL-MCNC: 0.8 MG/DL (ref 0–1.2)
BUN SERPL-MCNC: 16 MG/DL (ref 6–23)
CALCIUM SERPL-MCNC: 9.6 MG/DL (ref 8.6–10.3)
CHLORIDE SERPL-SCNC: 101 MMOL/L (ref 98–107)
CO2 SERPL-SCNC: 31 MMOL/L (ref 21–32)
CREAT SERPL-MCNC: 0.94 MG/DL (ref 0.5–1.3)
EGFRCR SERPLBLD CKD-EPI 2021: >90 ML/MIN/1.73M*2
EOSINOPHIL # BLD AUTO: 0.19 X10*3/UL (ref 0–0.7)
EOSINOPHIL NFR BLD AUTO: 4 %
ERYTHROCYTE [DISTWIDTH] IN BLOOD BY AUTOMATED COUNT: 11.9 % (ref 11.5–14.5)
EST. AVERAGE GLUCOSE BLD GHB EST-MCNC: 94 MG/DL
GLUCOSE SERPL-MCNC: 95 MG/DL (ref 74–99)
HBA1C MFR BLD: 4.9 %
HCT VFR BLD AUTO: 44.6 % (ref 41–52)
HGB BLD-MCNC: 15.1 G/DL (ref 13.5–17.5)
IMM GRANULOCYTES # BLD AUTO: 0.01 X10*3/UL (ref 0–0.7)
IMM GRANULOCYTES NFR BLD AUTO: 0.2 % (ref 0–0.9)
INR PPP: 1.1 (ref 0.9–1.1)
LYMPHOCYTES # BLD AUTO: 1.58 X10*3/UL (ref 1.2–4.8)
LYMPHOCYTES NFR BLD AUTO: 33 %
MCH RBC QN AUTO: 31.7 PG (ref 26–34)
MCHC RBC AUTO-ENTMCNC: 33.9 G/DL (ref 32–36)
MCV RBC AUTO: 94 FL (ref 80–100)
MONOCYTES # BLD AUTO: 0.42 X10*3/UL (ref 0.1–1)
MONOCYTES NFR BLD AUTO: 8.8 %
NEUTROPHILS # BLD AUTO: 2.57 X10*3/UL (ref 1.2–7.7)
NEUTROPHILS NFR BLD AUTO: 53.6 %
NRBC BLD-RTO: 0 /100 WBCS (ref 0–0)
P AXIS: 52 DEGREES
P OFFSET: 189 MS
P ONSET: 130 MS
PLATELET # BLD AUTO: 228 X10*3/UL (ref 150–450)
POTASSIUM SERPL-SCNC: 4.3 MMOL/L (ref 3.5–5.3)
PR INTERVAL: 172 MS
PROT SERPL-MCNC: 7.1 G/DL (ref 6.4–8.2)
PROTHROMBIN TIME: 12.1 SECONDS (ref 9.8–12.8)
Q ONSET: 216 MS
QRS COUNT: 9 BEATS
QRS DURATION: 90 MS
QT INTERVAL: 400 MS
QTC CALCULATION(BAZETT): 392 MS
QTC FREDERICIA: 395 MS
R AXIS: 43 DEGREES
RBC # BLD AUTO: 4.77 X10*6/UL (ref 4.5–5.9)
SODIUM SERPL-SCNC: 140 MMOL/L (ref 136–145)
T AXIS: 33 DEGREES
T OFFSET: 416 MS
VENTRICULAR RATE: 58 BPM
WBC # BLD AUTO: 4.8 X10*3/UL (ref 4.4–11.3)

## 2024-11-13 PROCEDURE — 85610 PROTHROMBIN TIME: CPT

## 2024-11-13 PROCEDURE — 36415 COLL VENOUS BLD VENIPUNCTURE: CPT

## 2024-11-13 PROCEDURE — 93005 ELECTROCARDIOGRAM TRACING: CPT

## 2024-11-13 PROCEDURE — 85025 COMPLETE CBC W/AUTO DIFF WBC: CPT

## 2024-11-13 PROCEDURE — 80053 COMPREHEN METABOLIC PANEL: CPT

## 2024-11-13 PROCEDURE — 83036 HEMOGLOBIN GLYCOSYLATED A1C: CPT

## 2024-11-18 ENCOUNTER — APPOINTMENT (OUTPATIENT)
Dept: PRIMARY CARE | Facility: CLINIC | Age: 54
End: 2024-11-18
Payer: COMMERCIAL

## 2024-11-18 VITALS
TEMPERATURE: 97.3 F | SYSTOLIC BLOOD PRESSURE: 118 MMHG | HEIGHT: 69 IN | BODY MASS INDEX: 26.36 KG/M2 | RESPIRATION RATE: 16 BRPM | WEIGHT: 178 LBS | HEART RATE: 74 BPM | DIASTOLIC BLOOD PRESSURE: 72 MMHG

## 2024-11-18 DIAGNOSIS — Z01.818 PREOPERATIVE CLEARANCE: Primary | ICD-10-CM

## 2024-11-18 PROCEDURE — 1036F TOBACCO NON-USER: CPT | Performed by: FAMILY MEDICINE

## 2024-11-18 PROCEDURE — 99214 OFFICE O/P EST MOD 30 MIN: CPT | Performed by: FAMILY MEDICINE

## 2024-11-18 PROCEDURE — 3008F BODY MASS INDEX DOCD: CPT | Performed by: FAMILY MEDICINE

## 2024-11-18 NOTE — PROGRESS NOTES
Keith Matos is a 53 y.o. male here today for   Chief Complaint   Patient presents with    Pre-op Exam        HPI     Patient will be undergoing a left total hip replacement with Dr. Dailey.  Surgery on 12/2/24 at surgical center in [place].  He already had preadmission testing labs and I reviewed them today.  They were essentially normal.    Past medical history: Hyperlipidemia but has never been on medications.  No history of CAD, lung disease, stroke, bleeding or clotting disorder, seizure disorder.    Past surgical history:  Knees x 2 for meniscus repair.  No anesthesia complications.      Social history:  No tobacco or vape use.  ETOH - 3-4 per week.      Family history:  no anesthesia complications.      No current outpatient medications on file.    Patient Active Problem List   Diagnosis    Erectile dysfunction    Mixed hyperlipidemia    Joint pain    Nevus    Sebaceous cyst    Prostate cancer screening    Derangement of knee    Localized primary osteoarthritis of wrist    Tear of medial meniscus of knee         Recent Results (from the past 4 weeks)   Hemoglobin A1C    Collection Time: 11/13/24  7:27 AM   Result Value Ref Range    Hemoglobin A1C 4.9 See comment %    Estimated Average Glucose 94 Not Established mg/dL   Protime-INR    Collection Time: 11/13/24  7:27 AM   Result Value Ref Range    Protime 12.1 9.8 - 12.8 seconds    INR 1.1 0.9 - 1.1   Comprehensive Metabolic Panel    Collection Time: 11/13/24  7:27 AM   Result Value Ref Range    Glucose 95 74 - 99 mg/dL    Sodium 140 136 - 145 mmol/L    Potassium 4.3 3.5 - 5.3 mmol/L    Chloride 101 98 - 107 mmol/L    Bicarbonate 31 21 - 32 mmol/L    Anion Gap 12 10 - 20 mmol/L    Urea Nitrogen 16 6 - 23 mg/dL    Creatinine 0.94 0.50 - 1.30 mg/dL    eGFR >90 >60 mL/min/1.73m*2    Calcium 9.6 8.6 - 10.3 mg/dL    Albumin 4.7 3.4 - 5.0 g/dL    Alkaline Phosphatase 45 33 - 120 U/L    Total Protein 7.1 6.4 - 8.2 g/dL    AST 21 9 - 39 U/L    Bilirubin, Total 0.8  "0.0 - 1.2 mg/dL    ALT 15 10 - 52 U/L   CBC and Auto Differential    Collection Time: 11/13/24  7:27 AM   Result Value Ref Range    WBC 4.8 4.4 - 11.3 x10*3/uL    nRBC 0.0 0.0 - 0.0 /100 WBCs    RBC 4.77 4.50 - 5.90 x10*6/uL    Hemoglobin 15.1 13.5 - 17.5 g/dL    Hematocrit 44.6 41.0 - 52.0 %    MCV 94 80 - 100 fL    MCH 31.7 26.0 - 34.0 pg    MCHC 33.9 32.0 - 36.0 g/dL    RDW 11.9 11.5 - 14.5 %    Platelets 228 150 - 450 x10*3/uL    Neutrophils % 53.6 40.0 - 80.0 %    Immature Granulocytes %, Automated 0.2 0.0 - 0.9 %    Lymphocytes % 33.0 13.0 - 44.0 %    Monocytes % 8.8 2.0 - 10.0 %    Eosinophils % 4.0 0.0 - 6.0 %    Basophils % 0.4 0.0 - 2.0 %    Neutrophils Absolute 2.57 1.20 - 7.70 x10*3/uL    Immature Granulocytes Absolute, Automated 0.01 0.00 - 0.70 x10*3/uL    Lymphocytes Absolute 1.58 1.20 - 4.80 x10*3/uL    Monocytes Absolute 0.42 0.10 - 1.00 x10*3/uL    Eosinophils Absolute 0.19 0.00 - 0.70 x10*3/uL    Basophils Absolute 0.02 0.00 - 0.10 x10*3/uL   ECG 12 Lead    Collection Time: 11/13/24  7:37 AM   Result Value Ref Range    Ventricular Rate 58 BPM    Atrial Rate 58 BPM    WI Interval 172 ms    QRS Duration 90 ms    QT Interval 400 ms    QTC Calculation(Bazett) 392 ms    P Axis 52 degrees    R Axis 43 degrees    T Axis 33 degrees    QRS Count 9 beats    Q Onset 216 ms    P Onset 130 ms    P Offset 189 ms    T Offset 416 ms    QTC Fredericia 395 ms        Objective    Visit Vitals    Visit Vitals  /72   Pulse 74   Temp 36.3 °C (97.3 °F)   Resp 16   Ht 1.753 m (5' 9\")   Wt 80.7 kg (178 lb)   BMI 26.29 kg/m²   Smoking Status Never   BSA 1.98 m²       Body mass index is 26.29 kg/m².     Physical Exam   General - Not in acute distress and cooperative.  Build & Nutrition - Well developed  Posture - Normal  Gait - Normal  Mental Status - alert and oriented x 3    Head - Normocephalic    Neck - Thyroid normal size    Eyes - Bilateral - Sclera clear and lids pink without edema or mass.      Skin - Warm and " dry with no rashes on visible skin    Lungs - Clear to auscultation and normal breathing effort    Cardiovascular - RRR and no murmurs, rubs or thrill.    Peripheral Vascular - Bilateral - no edema present    Neuropsychiatric - normal mood and affect         Assessment & Plan  Preoperative clearance  Patient is low risk for the upcoming left total hip replacement with Dr. Dailey.  I reviewed his preadmission testing labs.  He has no medical conditions which are contraindications to surgery and no medical history that increases his risk for surgery.  He is medically cleared for the surgery with general anesthesia.                  No orders of the defined types were placed in this encounter.       No orders of the defined types were placed in this encounter.

## 2024-11-20 ENCOUNTER — APPOINTMENT (OUTPATIENT)
Dept: ORTHOPEDIC SURGERY | Facility: CLINIC | Age: 54
End: 2024-11-20
Payer: COMMERCIAL

## 2024-11-20 DIAGNOSIS — M16.12 PRIMARY OSTEOARTHRITIS OF LEFT HIP: Primary | ICD-10-CM

## 2024-11-20 RX ORDER — ASPIRIN 81 MG/1
81 TABLET ORAL 2 TIMES DAILY
Qty: 60 TABLET | Refills: 0 | Status: SHIPPED | OUTPATIENT
Start: 2024-11-20 | End: 2024-12-20

## 2024-11-20 RX ORDER — CHLORHEXIDINE GLUCONATE ORAL RINSE 1.2 MG/ML
SOLUTION DENTAL
Qty: 30 ML | Refills: 0 | Status: SHIPPED | OUTPATIENT
Start: 2024-11-20

## 2024-11-20 RX ORDER — CYCLOBENZAPRINE HCL 10 MG
10 TABLET ORAL 3 TIMES DAILY PRN
Qty: 21 TABLET | Refills: 0 | Status: SHIPPED | OUTPATIENT
Start: 2024-11-20 | End: 2024-11-27

## 2024-11-20 RX ORDER — DOCUSATE SODIUM 100 MG/1
100 CAPSULE, LIQUID FILLED ORAL 2 TIMES DAILY PRN
Qty: 60 CAPSULE | Refills: 0 | Status: SHIPPED | OUTPATIENT
Start: 2024-11-20

## 2024-11-20 RX ORDER — ACETAMINOPHEN 325 MG/1
650 TABLET ORAL EVERY 6 HOURS PRN
Qty: 42 TABLET | Refills: 0 | Status: SHIPPED | OUTPATIENT
Start: 2024-11-20

## 2024-11-20 RX ORDER — ONDANSETRON 4 MG/1
4 TABLET, FILM COATED ORAL EVERY 8 HOURS PRN
Qty: 20 TABLET | Refills: 0 | Status: SHIPPED | OUTPATIENT
Start: 2024-11-20 | End: 2024-11-27

## 2024-11-20 RX ORDER — OXYCODONE HYDROCHLORIDE 5 MG/1
5 TABLET ORAL EVERY 6 HOURS PRN
Qty: 28 TABLET | Refills: 0 | Status: SHIPPED | OUTPATIENT
Start: 2024-11-20 | End: 2024-11-27

## 2024-11-20 RX ORDER — CHLORHEXIDINE GLUCONATE 40 MG/ML
SOLUTION TOPICAL
Qty: 473 ML | Refills: 0 | Status: SHIPPED | OUTPATIENT
Start: 2024-11-20

## 2024-11-20 NOTE — PROGRESS NOTES
Subjective    Patient ID: Keith    Chief Complaint:   Chief Complaint   Patient presents with    Left Hip - Preop Visit     [facility] at [facility]     This patient has pain in the hip that is increased with activity and weightbearing, the patient walks with an antalgic gait at this time.  The pain is interfering with activities of daily living.  The patient has limited range of motion of the hip and pain with passive range of motion.  This x-ray demonstrates joint space narrowing, subchondral sclerosis, and osteophyte formation.  The patient has failed to respond to conservative treatment with medication therapy and supportive devices for period of at least 3 months.     This is the preop visit to discuss the risks and benefits of the total hip replacement surgery.  These risks were fully explained to the patient.  With this, and as any surgery, infection is a risk.  The risk for infection is usually between 1 and 2%.  This risk is even higher in diabetics, persons with rheumatoid arthritis, patients with previous surgery, patients on oral steroid medication, and obesity.  All of these issues were properly discussed.  We always assure all sterile techniques will be followed during the procedure.  Patient is also need to be on antibiotics for the next 2 years for any minor surgical procedure, even dental work.  For high risk patients this will be for lifetime.  Severe infections may require removal of the prosthesis.     It was also explained to the patient that there will be some blood loss during the procedure.  Transfusions although rare will only be given when absolutely medically necessary.     Pulmonary embolism and blood clots were also discussed with the patient.  We discussed that these can be fatal complications of the procedure.  Is explained to the patient that the use of thromboembolic stockings, foot pumps, incentive spirometer, early mobility, and the use of blood thinners for a period of time is the  standard of care.     Dislocations are discussed with the patient.  It is explained that the highest risk for dislocating the hip happens during the first 3 months after surgery.  These risks tend to decrease with time.  This risk is higher and revisions.  It is explained to the patient that hip precautions are very important and that these will be carried out throughout the lifetime with her prosthesis.  Intraoperatively, we will adjust the length of the leg to tighten the joint to help prevent dislocation.  This leg lengthening to stabilize the joint is usually no more than 1/4 inch but may be more or less to improve stability.     Loosening and wear of the prosthesis is also discussed.  The prosthesis normally lasts between 12 and 15 years on the average.  Revisions may be more complicated.     Fractures, though rare, they also occur intraoperatively.  These fractures may occur in the pelvis or the femur.  There may be nerve or arterial injuries as well and these are discussed in detail.  Lastly the benefits of spinal anesthesia were explained to the patient.     All of the patient's questions and concerns were answered in detail.     This note was prepared using voice recognition software.  The details of this note are correct and have been reviewed, and corrected to the best of my ability.  Some grammatical areas may persist related to the Dragon software     Ruddy Boyer PA-C     [phone]

## 2024-12-02 PROCEDURE — 27130 TOTAL HIP ARTHROPLASTY: CPT | Performed by: ORTHOPAEDIC SURGERY

## 2024-12-02 PROCEDURE — 27130 TOTAL HIP ARTHROPLASTY: CPT | Performed by: PHYSICIAN ASSISTANT

## 2024-12-16 LAB
ATRIAL RATE: 58 BPM
P AXIS: 52 DEGREES
P OFFSET: 189 MS
P ONSET: 130 MS
PR INTERVAL: 172 MS
Q ONSET: 216 MS
QRS COUNT: 9 BEATS
QRS DURATION: 90 MS
QT INTERVAL: 400 MS
QTC CALCULATION(BAZETT): 392 MS
QTC FREDERICIA: 395 MS
R AXIS: 43 DEGREES
T AXIS: 33 DEGREES
T OFFSET: 416 MS
VENTRICULAR RATE: 58 BPM

## 2024-12-17 ENCOUNTER — OFFICE VISIT (OUTPATIENT)
Dept: ORTHOPEDIC SURGERY | Facility: CLINIC | Age: 54
End: 2024-12-17
Payer: COMMERCIAL

## 2024-12-17 ENCOUNTER — HOSPITAL ENCOUNTER (OUTPATIENT)
Dept: RADIOLOGY | Facility: CLINIC | Age: 54
Discharge: HOME | End: 2024-12-17
Payer: COMMERCIAL

## 2024-12-17 DIAGNOSIS — Z96.642 AFTERCARE FOLLOWING LEFT HIP JOINT REPLACEMENT SURGERY: Primary | ICD-10-CM

## 2024-12-17 DIAGNOSIS — Z47.1 AFTERCARE FOLLOWING LEFT HIP JOINT REPLACEMENT SURGERY: Primary | ICD-10-CM

## 2024-12-17 DIAGNOSIS — M25.552 PAIN OF LEFT HIP: ICD-10-CM

## 2024-12-17 DIAGNOSIS — M16.12 PRIMARY OSTEOARTHRITIS OF LEFT HIP: ICD-10-CM

## 2024-12-17 PROCEDURE — 73502 X-RAY EXAM HIP UNI 2-3 VIEWS: CPT | Mod: LEFT SIDE | Performed by: ORTHOPAEDIC SURGERY

## 2024-12-17 PROCEDURE — 73502 X-RAY EXAM HIP UNI 2-3 VIEWS: CPT | Mod: LT

## 2024-12-17 PROCEDURE — 99211 OFF/OP EST MAY X REQ PHY/QHP: CPT | Performed by: ORTHOPAEDIC SURGERY

## 2024-12-17 RX ORDER — CYCLOBENZAPRINE HCL 10 MG
10 TABLET ORAL 3 TIMES DAILY PRN
Qty: 21 TABLET | Refills: 0 | Status: SHIPPED | OUTPATIENT
Start: 2024-12-17 | End: 2024-12-24

## 2024-12-17 NOTE — PROGRESS NOTES
Subjective    Patient ID: Keith    Chief Complaint:   Chief Complaint   Patient presents with    Left Hip - Post-op     LT THR 12/2/24, 2 weeks out, with x-rays today     History of present illness    54-year-old gentleman presented clinic today for his first postop follow-up visit status post left total hip replacement performed 12/2/2024.  Overall doing extremely well.  Ambulating with assistance of a cane.  He is taking a few Tylenol a day no longer taking his opioid.  Still having some difficulty with sleeping but is starting to be able to stack hours together.  He is looking forward to starting outpatient physical therapy next week.  Patient has no complaints of shortness of breath or numbness or tingling.      Past medical , Surgical, Family and social history reviewed.      Physical exam  General: No acute distress and breathing comfortably.  Patient is pleasant and cooperative with the examination.    Extremity  Left hip is neurovascular intact.  Good range of motion.  No sign of infection.  Compartment soft.  Capillary refill within wrist distally.  2+ pulses bilateral lower extremity.  Incision is well-healed at this time.  No hematoma or seroma seen.    Diagnostics  Please see dictated x-ray report  No results found.     Procedure  [ none]    Assessment  Status post left total hip replacement    Treatment plan  1.  Wound instructions were discussed with patient.  2.  He was encouraged continue with weightbearing activity as tolerated.  Continue with outpatient physical therapy.  E order placed in the chart today as well as a refill of the cyclobenzaprine  3.  He will follow-up with us in approximately 3 weeks for reevaluation without x-ray  4.  All of the patient's questions were answered.    Orders Placed This Encounter    XR hip left with pelvis when performed 2 or 3 views       This note was prepared using voice recognition software.  The details of this note are correct and have been reviewed, and  corrected to the best of my ability.  Some grammatical areas may persist related to the Dragon software    Ruddy Boyer PA-C, [facility]  [facility]  Orthopedic [place]    [phone]

## 2024-12-18 ENCOUNTER — EVALUATION (OUTPATIENT)
Dept: PHYSICAL THERAPY | Facility: HOSPITAL | Age: 54
End: 2024-12-18
Payer: COMMERCIAL

## 2024-12-18 DIAGNOSIS — R26.89 ANTALGIC GAIT: ICD-10-CM

## 2024-12-18 DIAGNOSIS — M25.552 LEFT HIP PAIN: Primary | ICD-10-CM

## 2024-12-18 PROCEDURE — 97161 PT EVAL LOW COMPLEX 20 MIN: CPT | Mod: GP

## 2024-12-18 PROCEDURE — 97110 THERAPEUTIC EXERCISES: CPT | Mod: GP

## 2024-12-18 ASSESSMENT — PAIN - FUNCTIONAL ASSESSMENT: PAIN_FUNCTIONAL_ASSESSMENT: 0-10

## 2024-12-18 ASSESSMENT — PAIN SCALES - GENERAL: PAINLEVEL_OUTOF10: 2

## 2024-12-18 ASSESSMENT — PAIN DESCRIPTION - DESCRIPTORS: DESCRIPTORS: ACHING

## 2024-12-20 ENCOUNTER — TREATMENT (OUTPATIENT)
Dept: PHYSICAL THERAPY | Facility: HOSPITAL | Age: 54
End: 2024-12-20
Payer: COMMERCIAL

## 2024-12-20 ENCOUNTER — APPOINTMENT (OUTPATIENT)
Dept: PHYSICAL THERAPY | Facility: HOSPITAL | Age: 54
End: 2024-12-20
Payer: COMMERCIAL

## 2024-12-20 DIAGNOSIS — R26.89 ANTALGIC GAIT: ICD-10-CM

## 2024-12-20 DIAGNOSIS — M25.552 LEFT HIP PAIN: Primary | ICD-10-CM

## 2024-12-20 PROCEDURE — 97110 THERAPEUTIC EXERCISES: CPT | Mod: GP,CQ

## 2024-12-20 ASSESSMENT — PAIN - FUNCTIONAL ASSESSMENT: PAIN_FUNCTIONAL_ASSESSMENT: 0-10

## 2024-12-20 ASSESSMENT — PAIN SCALES - GENERAL: PAINLEVEL_OUTOF10: 0 - NO PAIN

## 2024-12-30 ENCOUNTER — TREATMENT (OUTPATIENT)
Dept: PHYSICAL THERAPY | Facility: HOSPITAL | Age: 54
End: 2024-12-30
Payer: COMMERCIAL

## 2024-12-30 ENCOUNTER — TELEPHONE (OUTPATIENT)
Dept: PHYSICAL THERAPY | Facility: HOSPITAL | Age: 54
End: 2024-12-30

## 2024-12-30 DIAGNOSIS — M25.552 LEFT HIP PAIN: Primary | ICD-10-CM

## 2024-12-30 DIAGNOSIS — R26.89 ANTALGIC GAIT: ICD-10-CM

## 2024-12-30 PROCEDURE — 97110 THERAPEUTIC EXERCISES: CPT | Mod: GP

## 2024-12-30 ASSESSMENT — PAIN SCALES - GENERAL: PAINLEVEL_OUTOF10: 0 - NO PAIN

## 2024-12-30 ASSESSMENT — PAIN - FUNCTIONAL ASSESSMENT: PAIN_FUNCTIONAL_ASSESSMENT: 0-10

## 2024-12-30 NOTE — PROGRESS NOTES
Physical Therapy    Physical Therapy Treatment    Patient Name: Keith Matos  MRN: [id number]  Today's Date: 12/30/2024    Time Entry:   Time Calculation  Start Time: 0830  Stop Time: 0912  Time Calculation (min): 42 min     PT Therapeutic Procedures Time Entry  Therapeutic Exercise Time Entry: 42                 Insurance   ANTHEM BMN PT COPAY 0 DED [id number])  COVERAGE 100 OOP [id number])   NO AUTH REQ  REF# HANSA T [id number]      Visit: 3/8    Assessment:   Patient is doing remarkably well. He is ambulating with mild gait deviations. Introduced step overs on 6 in step with ability to complete without compensation. Introduced single leg balance with minimal support needed to maintaining standing. Some hip strategy observed. Notes pain in L hip flexor with SLR and standing hip flexion. Attempted PPT to see if this decreased irritation on L hip flexor with SLR. He has much difficulty finding APT and PPT. Trialed on physioball with some improvement. Discussed trialing at home. HEP updated  Plan:   Progress strength and balance    Current Problem  1. Left hip pain        2. Antalgic gait            General   Patient arrived without SPC. Notes that he has been using is very minimally. Denies pain today. Notes discomfort with touching incision or laying on it.        Subjective    Precautions  Precautions  Precautions Comment: LLE WBAT, posterior REYES precautions    Pain  Pain Assessment  Pain Assessment: 0-10  0-10 (Numeric) Pain Score: 0 - No pain    Objective     Treatments:  Nu-step L4 8 min  HR/TR 1/2 roll: x20 each  Squat:  3x10  3 way hip: RTB x15 each   Step ups F/L 6 in x20  Step over: 6in x10  Marches on airex 2x10  Lateral stepping: RTB 2 laps  L SLS: 10x10 seconds   SLR: 2x10  PPT: x5 (difficulty with cues)  APT and PPT on PB: x10  Side lying hip abduction: 2x10  Bridge with arms up or crossed over chest: 2x10 with 3 second hold    Supine hip flexor stretch: ---    Access Code: [email or web address]  URL:  [email or web address]  Date: 12/30/2024  Prepared by: Marielle Lopez    Exercises  - Supine Active Straight Leg Raise  - 1 x daily - 7 x weekly - 2-3 sets - 10 reps  - Supine Bridge  - 1 x daily - 7 x weekly - 2-3 sets - 10 reps - 3 seconds hold  - Sidelying Hip Abduction  - 1 x daily - 7 x weekly - 2-3 sets - 10 reps  - Mini Squat with Counter Support  - 1 x daily - 7 x weekly - 2-3 sets - 10 reps  - Modified Bang Stretch  - 1 x daily - 7 x weekly - 3 sets - 1 reps - 30 seconds  hold  - Standing 3-Way Leg Reach with Resistance at Ankles and Counter Support  - 1 x daily - 7 x weekly - 2-3 sets - 10 reps  - Pelvic Tilt on Swiss Ball  - 1 x daily - 7 x weekly - 3 sets - 10 reps  - Side Stepping with Resistance at Ankles  - 1 x daily - 7 x weekly - 3 sets - 10 reps  - Single Leg Stance  - 1 x daily - 7 x weekly - 1 sets - 10 reps - 10 seconds hold

## 2024-12-30 NOTE — TELEPHONE ENCOUNTER
PC TO PT TO ADV APPT SCHED 01/23 MOVED TO NEW CHECK-IN OF 7:45AM. APPT MOVED TO FIT ANOTHER POST-OP PT IN ON SCHED THAT SAME DATE; LMOM IN REFERENCE

## 2024-12-31 ENCOUNTER — TELEPHONE (OUTPATIENT)
Dept: PHYSICAL THERAPY | Facility: HOSPITAL | Age: 54
End: 2024-12-31
Payer: COMMERCIAL

## 2024-12-31 ENCOUNTER — APPOINTMENT (OUTPATIENT)
Dept: PHYSICAL THERAPY | Facility: HOSPITAL | Age: 54
End: 2024-12-31
Payer: COMMERCIAL

## 2024-12-31 DIAGNOSIS — M25.552 LEFT HIP PAIN: Primary | ICD-10-CM

## 2024-12-31 DIAGNOSIS — R26.89 ANTALGIC GAIT: ICD-10-CM

## 2024-12-31 NOTE — TELEPHONE ENCOUNTER
PC TO PT RE: APPT SCHED T 01/14. SPOKE TO DIRECT AND ADV APPT TIME MOVED TO 8:30AM TO TIGHTEN PROVIDERS SCHED

## 2025-01-02 ENCOUNTER — TREATMENT (OUTPATIENT)
Dept: PHYSICAL THERAPY | Facility: HOSPITAL | Age: 55
End: 2025-01-02
Payer: COMMERCIAL

## 2025-01-02 DIAGNOSIS — M25.552 LEFT HIP PAIN: Primary | ICD-10-CM

## 2025-01-02 DIAGNOSIS — R26.89 ANTALGIC GAIT: ICD-10-CM

## 2025-01-02 PROCEDURE — 97110 THERAPEUTIC EXERCISES: CPT | Mod: GP,CQ

## 2025-01-02 ASSESSMENT — PAIN SCALES - GENERAL: PAINLEVEL_OUTOF10: 0 - NO PAIN

## 2025-01-02 ASSESSMENT — PAIN - FUNCTIONAL ASSESSMENT: PAIN_FUNCTIONAL_ASSESSMENT: 0-10

## 2025-01-02 NOTE — PROGRESS NOTES
Physical Therapy Treatment    Patient Name: Keith Matos  MRN: [id number]  Today's Date: 1/2/2025                         Current Problem  1. Left hip pain        2. Antalgic gait            Insurance   ANTHEM BMN PT COPAY 0 DED [id number])  COVERAGE 100 OOP [id number])   NO AUTH REQ  REF# HANSA T [id number]      Visit: 4/8    Precautions  Precautions  Precautions Comment: LLE WBAT, posterior REYES precautions      Subjective   Pt stating he over did it on [place], took it easy yesterday. Denies pain currently.     Pain  Pain Assessment: 0-10  0-10 (Numeric) Pain Score: 0 - No pain      Objective     Treatments:  Nu-step L4 8 min  HR/TR 1/2 roll: x20 each  Squat:  3x10  3 way hip: GTB 2x10 each   Step ups F/L 8 in x15  Step over: 6in x10  Marches on airex 2x10  Lateral stepping: GTB 2 laps  L SLS on airex: 10x10 seconds   SLR: 2x15  PPT: x5 (difficulty with cues)  Side lying hip abduction: 2x15  Bridge with PB @ ankles 2x10    Assessment:  Progressed resistance with hip strengthening, added dynamic surface with SLS, and progressed step height with step ups. Increased reps with SLR with muscle fatigue reported. Pt continued to deny pain but muscle soreness noted.     Plan:  Cont to progress strength

## 2025-01-07 ENCOUNTER — TREATMENT (OUTPATIENT)
Dept: PHYSICAL THERAPY | Facility: HOSPITAL | Age: 55
End: 2025-01-07
Payer: COMMERCIAL

## 2025-01-07 DIAGNOSIS — M25.552 LEFT HIP PAIN: Primary | ICD-10-CM

## 2025-01-07 DIAGNOSIS — R26.89 ANTALGIC GAIT: ICD-10-CM

## 2025-01-07 PROCEDURE — 97110 THERAPEUTIC EXERCISES: CPT | Mod: GP,CQ

## 2025-01-07 ASSESSMENT — PAIN SCALES - GENERAL: PAINLEVEL_OUTOF10: 0 - NO PAIN

## 2025-01-07 ASSESSMENT — PAIN - FUNCTIONAL ASSESSMENT: PAIN_FUNCTIONAL_ASSESSMENT: 0-10

## 2025-01-07 NOTE — PROGRESS NOTES
Physical Therapy Treatment    Patient Name: Keith Matos  MRN: [id number]  Today's Date: 1/7/2025     PT Therapeutic Procedures Time Entry  Therapeutic Exercise Time Entry: 35                   Current Problem  1. Left hip pain        2. Antalgic gait            Insurance   ANTHEM BMN PT COPAY 0 DED [id number])  COVERAGE 100 OOP [id number])   NO AUTH REQ  REF# HANSA T [id number]      Visit: 5/8      Subjective   Pt stating he over did it yesterday, snow blowed everyone's driveways. Soreness reported after his last visit.     Pain  Pain Assessment: 0-10  0-10 (Numeric) Pain Score: 0 - No pain      Objective     Treatments:  Nu-step L4 8 min  HR/TR 1/2 roll: --  Squat:  3x10  3 way hip: GTB 2x10 each   Step ups F/L 8 in 2x10  Step over: 8in x10  Marches on airex 2x15  Lateral stepping: GTB 2 laps  L SLS on airex: 10x10 seconds   SLR: 2x10 1#  Side lying hip abduction: 2x10 1#  Bridge with PB @ ankles 2x10    Assessment:  Progressed step height with step overs and added wt with SLR. Pt reporting less pulling in his hip flexor with SLR today. No UE support required with dynamic balance. Follows up with Dr. Dailey tomorrow.     Plan:  Progress SLS, marches, and squats onto BOSU

## 2025-01-08 ENCOUNTER — OFFICE VISIT (OUTPATIENT)
Dept: ORTHOPEDIC SURGERY | Facility: CLINIC | Age: 55
End: 2025-01-08
Payer: COMMERCIAL

## 2025-01-08 DIAGNOSIS — Z96.642 AFTERCARE FOLLOWING LEFT HIP JOINT REPLACEMENT SURGERY: Primary | ICD-10-CM

## 2025-01-08 DIAGNOSIS — Z47.1 AFTERCARE FOLLOWING LEFT HIP JOINT REPLACEMENT SURGERY: Primary | ICD-10-CM

## 2025-01-08 PROCEDURE — 99211 OFF/OP EST MAY X REQ PHY/QHP: CPT | Performed by: ORTHOPAEDIC SURGERY

## 2025-01-09 ENCOUNTER — TREATMENT (OUTPATIENT)
Dept: PHYSICAL THERAPY | Facility: HOSPITAL | Age: 55
End: 2025-01-09
Payer: COMMERCIAL

## 2025-01-09 DIAGNOSIS — R26.89 ANTALGIC GAIT: ICD-10-CM

## 2025-01-09 DIAGNOSIS — M25.552 LEFT HIP PAIN: Primary | ICD-10-CM

## 2025-01-09 PROCEDURE — 97110 THERAPEUTIC EXERCISES: CPT | Mod: GP,CQ

## 2025-01-09 ASSESSMENT — PAIN SCALES - GENERAL: PAINLEVEL_OUTOF10: 0 - NO PAIN

## 2025-01-09 ASSESSMENT — PAIN - FUNCTIONAL ASSESSMENT: PAIN_FUNCTIONAL_ASSESSMENT: 0-10

## 2025-01-09 NOTE — PROGRESS NOTES
History of present illness    54-year-old male follow-up left total hip replacement from December 2 states is doing very well he is ambulating without assist device no longer taking medication continue work with his physical therapy      Past medical , Surgical, Family and social history reviewed.      Physical exam  General: No acute distress and breathing comfortably.  Patient is pleasant and cooperative with the examination.    Extremity  Incision well-healed good range of motion neurologically tact distally brisk cap refill compartments are soft calf is nontender    Diagnostics      XR hip left with pelvis when performed 2 or 3 views    Result Date: 12/17/2024  Interpreted By:  Meagan Dailey, STUDY: XR HIP LEFT WITH PELVIS WHEN PERFORMED 2 OR 3 VIEWS; 12/17/2024 9:36 am   INDICATION: Signs/Symptoms:pain.   ACCESSION NUMBER(S): [id number]   ORDERING CLINICIAN: MEAGAN DAILEY   FINDINGS: Two views show patient status post total hip replacement in good alignment.  No signs of fracture dislocation or other bony abnormality       Signed by: Meagan Dailey 12/17/2024 10:24 AM Dictation workstation:   [id number]       Procedure  [ none]    Assessment  Status post total hip replacement left    Treatment plan  1.  Continue working on range of motion gently continue with his physical therapy follow-up 6 weeks with x-rays sooner if he has increased pain or discomfort.  2. [   ]  3. [   ]  4.  All of the patient's questions were answered.    No orders of the defined types were placed in this encounter.      This note was prepared using voice recognition software.  The details of this note are correct and have been reviewed, and corrected to the best of my ability.  Some grammatical areas may persist related to the Dragon software    Meagan Dailey MD  Senior Attending Physician  [facility]  [facility] [place]    [phone]

## 2025-01-09 NOTE — PROGRESS NOTES
"Physical Therapy Treatment    Patient Name: Keith Matos  MRN: [id number]  Today's Date: 1/9/2025     PT Therapeutic Procedures Time Entry  Therapeutic Exercise Time Entry: 41                   Current Problem  1. Left hip pain        2. Antalgic gait            Insurance   ANTHEM BMN PT COPAY 0 DED [id number])  COVERAGE 100 OOP [id number])   NO AUTH REQ  REF# HANSA T [id number]/ALL      Visit: 6/8  Precautions  Precautions  Precautions Comment: LLE WBAT, posterior REYES precautions      Subjective   Pt stating he followed up with Ruddy yesterday, pleased with everything. No longer has hip precautions.     Pain  Pain Assessment: 0-10  0-10 (Numeric) Pain Score: 0 - No pain      Objective     Treatments:  Nu-step L5 8 min  HR/TR 1/2 roll: --  Squats on PB:  3x10  3 way hip: GTB 3x10 each   Step ups F/L on BOSU: 2x10  Step over: 8in x10--  Marches on BOSU 2x15  Lateral stepping/monster walks: GTB 2 laps  L SLS on BOSU : 5x15 seconds   SLR: 2x10 1#---  Side lying hip abduction: 2x10 1#--  Bridges on PB with HSC 2x15  Hamstring stretch supine 30\"x2 B    Assessment:  Progressed multiple exercises today with good tolerance. Added monster walks and hamstring stretch. Pt continues to display good dynamic balance with minimal UE support. Updated HEP and reviewed.     Plan:  Cont to progress strength and dynamic balance       "

## 2025-01-14 ENCOUNTER — TREATMENT (OUTPATIENT)
Dept: PHYSICAL THERAPY | Facility: HOSPITAL | Age: 55
End: 2025-01-14
Payer: COMMERCIAL

## 2025-01-14 DIAGNOSIS — M25.552 LEFT HIP PAIN: Primary | ICD-10-CM

## 2025-01-14 DIAGNOSIS — R26.89 ANTALGIC GAIT: ICD-10-CM

## 2025-01-14 PROCEDURE — 97110 THERAPEUTIC EXERCISES: CPT | Mod: GP

## 2025-01-14 ASSESSMENT — PAIN - FUNCTIONAL ASSESSMENT: PAIN_FUNCTIONAL_ASSESSMENT: 0-10

## 2025-01-14 ASSESSMENT — PAIN SCALES - GENERAL: PAINLEVEL_OUTOF10: 0 - NO PAIN

## 2025-01-14 NOTE — PROGRESS NOTES
"Physical Therapy    Physical Therapy Treatment    Patient Name: Keith Matos  MRN: [id number]  Today's Date: 1/14/2025    Time Entry:   Time Calculation  Start Time: 0830  Stop Time: 0912  Time Calculation (min): 42 min     PT Therapeutic Procedures Time Entry  Therapeutic Exercise Time Entry: 42                 Insurance   ANTHEM BMN PT COPAY 0 DED [id number])  COVERAGE 100 OOP [id number])   NO AUTH REQ  REF# HANSA T [id number]      Visit: 7/8    Assessment:   Continued with challenging strengthening exercises today. Introduced BOSU squats with ability to complete with minimal UE support. Able to complete BOSU step ups with minimal need for UE support to maintain balance. Supination of B ankles noted on marching on BOSU that he is attributing to loose footwear. Introduced stretches as he no longer has hip precautions. Introduced upright bike with good ability to ride and no c/o pain  Plan:   Reassessment with anticipation to transition to HEP    Current Problem  1. Left hip pain        2. Antalgic gait            General   Patient notes that his hip is feeling great. He only has pain when he lays on the surgical side and pain is 1/10. No longer has hip precautions.        Subjective    Precautions  Precautions  Precautions Comment: LLE WBAT,       Pain  Pain Assessment  Pain Assessment: 0-10  0-10 (Numeric) Pain Score: 0 - No pain    Objective     Treatments:  Nu-step L5 to L6 after 4 min 8 min  Upright bike: 5 min   SKTC: 10x10 seconds   Piriformis stretch: 10x10 seconds  Step ups F/L on BOSU: x15  Marches on BOSU x20  L SLS on BOSU : 5x15 seconds   Squats on BOSU flat side: x20  3 way hip: BTB 2x10 each   Lateral stepping/monster walks: BTB 2 laps  Heel tap F/L: 4\"x15 each     Step over: 8in x10--  SLR: 2x10 1#---  Side lying hip abduction: 2x10 1#--  Bridges on PB with HSC ---  Hamstring stretch supine ---      "

## 2025-01-16 ENCOUNTER — APPOINTMENT (OUTPATIENT)
Dept: PHYSICAL THERAPY | Facility: HOSPITAL | Age: 55
End: 2025-01-16
Payer: COMMERCIAL

## 2025-01-16 DIAGNOSIS — R26.89 ANTALGIC GAIT: ICD-10-CM

## 2025-01-16 DIAGNOSIS — M25.552 LEFT HIP PAIN: Primary | ICD-10-CM

## 2025-01-23 ENCOUNTER — TREATMENT (OUTPATIENT)
Dept: PHYSICAL THERAPY | Facility: HOSPITAL | Age: 55
End: 2025-01-23
Payer: COMMERCIAL

## 2025-01-23 DIAGNOSIS — M25.552 LEFT HIP PAIN: Primary | ICD-10-CM

## 2025-01-23 DIAGNOSIS — R26.89 ANTALGIC GAIT: ICD-10-CM

## 2025-01-23 PROCEDURE — 97110 THERAPEUTIC EXERCISES: CPT | Mod: GP

## 2025-01-23 ASSESSMENT — PAIN SCALES - GENERAL: PAINLEVEL_OUTOF10: 0 - NO PAIN

## 2025-01-23 ASSESSMENT — PAIN - FUNCTIONAL ASSESSMENT: PAIN_FUNCTIONAL_ASSESSMENT: 0-10

## 2025-01-23 NOTE — PROGRESS NOTES
"Physical Therapy    Physical Therapy Treatment    Patient Name: Keith Matos  MRN: [id number]  Today's Date: 2025    Time Entry:   Time Calculation  Start Time: 745  Stop Time: 816  Time Calculation (min): 31 min     PT Therapeutic Procedures Time Entry  Therapeutic Exercise Time Entry: 31                 Insurance   ANTHEM BMN PT COPAY 0 DED [id number])  COVERAGE 100 OOP [id number])   NO AUTH REQ  REF# HANSA T [id number]/ALL      Visit:     Assessment:   Patient has attended 8 PT visits s/p L REYES. He has met all of his goals. He demonstrates improved L hip ROM and strength. He has been able to return to just about all PLOF aside from riding bicycle on the road. However, this is weather limiting. He has been riding stationary bike wihtout difficulty. HEP reviewed today with updated information provided. Educated on how to progress with understanding. D/t overall improvement patient to be discharged to [facility]  Plan:   Discharge     Current Problem  1. Left hip pain        2. Antalgic gait            General   Has been riding upright bike and recumbent bike.   Notes some continued discomfort along incision.  Notes 90% overall improvement.   Compliant with HEP  Able to do reciprocal pattern on stairs.        Subjective    Precautions  Precautions  Precautions Comment: LLE WBAT,     Pain  Pain Assessment  Pain Assessment: 0-10  0-10 (Numeric) Pain Score: 0 - No pain    Objective         BALANCE: impaired                         GAIT: amb with Spc, antalgic gait, uneven step length         AROM  Left hip flexion: 90                 MMT  Left hip ER: 4+/5    Left hip IR: 4+/5  Left quadriceps: 5/5    Left iliopsoas: 4+/5  Left gluteus medius: 4+/5   Left gluteus chata: 4+/5    Left hamstrin/5       Outcome Measures:  Other Measures  Lower Extremity Funtional Score (LEFS): 74  Treatments:  Reassessment  HEP reveiwed  Heel taps: 4\" x10 each jalil  Lunge: x10 each       Goals:  1. I w/advanced HEP " (MET)  2. L LE grossly 4+-5/5 including good eccentric L quad to perform all functional mobility (MET)  3. perform sit<>stand no UE assist from low surface for max functional mobility (MET)  4. L LE SLS >/=30 sec no UE assist on compliant surface without UE assist for functional carryover into dynamic balance (MET)  5. amb community distance nil deficits over varying surfaces/inclines independent without AD or increased pain L LE (MET)

## 2025-02-18 ENCOUNTER — HOSPITAL ENCOUNTER (OUTPATIENT)
Dept: RADIOLOGY | Facility: CLINIC | Age: 55
Discharge: HOME | End: 2025-02-18
Payer: COMMERCIAL

## 2025-02-18 ENCOUNTER — OFFICE VISIT (OUTPATIENT)
Dept: ORTHOPEDIC SURGERY | Facility: CLINIC | Age: 55
End: 2025-02-18
Payer: COMMERCIAL

## 2025-02-18 DIAGNOSIS — Z96.642 STATUS POST LEFT HIP REPLACEMENT: ICD-10-CM

## 2025-02-18 DIAGNOSIS — Z47.1 AFTERCARE FOLLOWING LEFT HIP JOINT REPLACEMENT SURGERY: Primary | ICD-10-CM

## 2025-02-18 DIAGNOSIS — Z47.1 AFTERCARE FOLLOWING LEFT HIP JOINT REPLACEMENT SURGERY: ICD-10-CM

## 2025-02-18 DIAGNOSIS — Z96.642 AFTERCARE FOLLOWING LEFT HIP JOINT REPLACEMENT SURGERY: Primary | ICD-10-CM

## 2025-02-18 DIAGNOSIS — Z96.642 AFTERCARE FOLLOWING LEFT HIP JOINT REPLACEMENT SURGERY: ICD-10-CM

## 2025-02-18 PROCEDURE — 99024 POSTOP FOLLOW-UP VISIT: CPT | Performed by: ORTHOPAEDIC SURGERY

## 2025-02-18 PROCEDURE — 1036F TOBACCO NON-USER: CPT | Performed by: ORTHOPAEDIC SURGERY

## 2025-02-18 PROCEDURE — 73502 X-RAY EXAM HIP UNI 2-3 VIEWS: CPT | Mod: LEFT SIDE | Performed by: ORTHOPAEDIC SURGERY

## 2025-02-18 PROCEDURE — 73502 X-RAY EXAM HIP UNI 2-3 VIEWS: CPT | Mod: LT

## 2025-02-18 PROCEDURE — 99211 OFF/OP EST MAY X REQ PHY/QHP: CPT | Performed by: ORTHOPAEDIC SURGERY

## 2025-02-18 NOTE — PROGRESS NOTES
History of present illness    54-year-old male follow-up left total hip replacement surgery December 2 states doing very well he is ambulating without assist device no complaints at this time no numbness or tingling no fevers or chills.      Past medical , Surgical, Family and social history reviewed.      Physical exam  General: No acute distress and breathing comfortably.  Patient is pleasant and cooperative with the examination.    Extremity  Hip incision well-healed no sign infection good range of motion neurologically intact distally brisk cap refill compartments soft calf is nontender    Diagnostics      XR hip left with pelvis when performed 2 or 3 views    Result Date: 2/18/2025  Interpreted By:  Meagan Dailey, STUDY: XR HIP LEFT WITH PELVIS WHEN PERFORMED 2 OR 3 VIEWS; 2/18/2025 8:17 am   INDICATION: Signs/Symptoms:s/p THR.   ACCESSION NUMBER(S): [id number]   ORDERING CLINICIAN: MEAGAN DAILEY   FINDINGS: Two views show patient status post total hip replacement in good alignment.  No signs of fracture dislocation or other bony abnormality       Signed by: Meagan Dailey 2/18/2025 8:23 AM Dictation workstation:   [id number]       Procedure  [ none]    Assessment  Status post total hip replacement left    Treatment plan  1.  Continue work on range of motion strengthening continue to weight-bear as tolerated follow-up in 3 months with x-rays sooner if he has increased pain or discomfort.  2. [   ]  3. [   ]  4.  All of the patient's questions were answered.    Orders Placed This Encounter    XR hip left with pelvis when performed 2 or 3 views       This note was prepared using voice recognition software.  The details of this note are correct and have been reviewed, and corrected to the best of my ability.  Some grammatical areas may persist related to the Dragon software    Meagan Dailey MD  Senior Attending Physician  [facility]  [place]    [phone]

## 2025-05-20 ENCOUNTER — APPOINTMENT (OUTPATIENT)
Dept: ORTHOPEDIC SURGERY | Facility: CLINIC | Age: 55
End: 2025-05-20

## 2025-06-09 ENCOUNTER — APPOINTMENT (OUTPATIENT)
Dept: PRIMARY CARE | Facility: CLINIC | Age: 55
End: 2025-06-09
Payer: COMMERCIAL

## 2025-06-09 VITALS
RESPIRATION RATE: 16 BRPM | TEMPERATURE: 97.5 F | SYSTOLIC BLOOD PRESSURE: 128 MMHG | BODY MASS INDEX: 27.7 KG/M2 | HEIGHT: 69 IN | DIASTOLIC BLOOD PRESSURE: 82 MMHG | HEART RATE: 63 BPM | WEIGHT: 187 LBS

## 2025-06-09 DIAGNOSIS — Z12.5 PROSTATE CANCER SCREENING: Primary | ICD-10-CM

## 2025-06-09 DIAGNOSIS — Z00.00 ENCOUNTER FOR PREVENTIVE HEALTH EXAMINATION: ICD-10-CM

## 2025-06-09 PROCEDURE — 1036F TOBACCO NON-USER: CPT | Performed by: FAMILY MEDICINE

## 2025-06-09 PROCEDURE — 3008F BODY MASS INDEX DOCD: CPT | Performed by: FAMILY MEDICINE

## 2025-06-09 PROCEDURE — 90677 PCV20 VACCINE IM: CPT | Performed by: FAMILY MEDICINE

## 2025-06-09 PROCEDURE — 90471 IMMUNIZATION ADMIN: CPT | Performed by: FAMILY MEDICINE

## 2025-06-09 PROCEDURE — 99396 PREV VISIT EST AGE 40-64: CPT | Performed by: FAMILY MEDICINE

## 2025-06-09 NOTE — PROGRESS NOTES
Keith Matos is a 54 y.o. male here today a periodic health exam.  I reviewed previous preventative health measures including screening tests, immunizations and labs.      HPI   CURRENT COMPLAINTS OR CONCERNS:    Had L THR in December 2024 - is doing well.      PREVIOUS PREVENTATIVE HEALTH    Colonoscopy : Yes 7/1/2024  Cologuard : YES -- DATE 6/2021   PSA : Yes 6/12/2024  Hepatitis C Antibody : Yes 6/12/2024  HIV Screening : Yes 6/12/2024  Prevnar : Yes - 6/9/2025  Tdap : Yes 5/31/2022  Shingrix : Yes 10/2/23 and 6/5/2023  Yearly Flu Shot : YES    CURRENT FINDINGS  Healthy Diet : YES  Exercise :  YES --  regularly  Depression or Anxiety Issues : NO  Alcohol Use : YES --  about 3 to 4/week  Tobacco Use : NO  Drug Use : NO      Past Medical History    Patient Active Problem List    Diagnosis Date Noted    Antalgic gait 12/18/2024    Prostate cancer screening 06/05/2023    Erectile dysfunction 06/02/2023    Mixed hyperlipidemia 06/02/2023    Left hip pain 06/02/2023    Nevus 06/02/2023    Sebaceous cyst 06/02/2023    Tear of medial meniscus of knee 09/13/2021    Derangement of knee 08/30/2021    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 Expectorate after rinsing, do not swallow. Use the night before and morning of surgery.        Immunization History   Administered Date(s) Administered    COVID-19, subunit, rS-nanoparticle, adjuvanted, PF, 5 mcg/0.5 mL 10/15/2024    Flu vaccine (IIV4), preservative free *Check age/dose* 09/27/2021    Flu vaccine, trivalent, preservative free, no egg protein, age 6 months or greater (Flucelvax) 10/15/2024    Influenza, injectable, MDCK, quadrivalent 10/07/2022    Moderna COVID-19 vaccine, 12 years and older (50mcg/0.5mL)(Spikevax) 09/28/2023    Moderna COVID-19  "vaccine, bivalent, blue cap/gray label *Check age/dose* 10/07/2022    Moderna SARS-CoV-2 Vaccination 03/11/2021, 04/08/2021, 10/25/2021, 04/15/2022    Pneumococcal conjugate vaccine, 20-valent (PREVNAR 20) 06/09/2025    Tdap vaccine, age 7 year and older (BOOSTRIX, ADACEL) 11/07/2012, 05/31/2022    Zoster vaccine, recombinant, adult (SHINGRIX) 06/05/2023, 10/02/2023        Social History  Social History     Socioeconomic History    Marital status:      Spouse name: Not on file    Number of children: Not on file    Years of education: Not on file    Highest education level: Not on file   Occupational History    Not on file   Tobacco Use    Smoking status: Never     Passive exposure: Never    Smokeless tobacco: Never   Substance and Sexual Activity    Alcohol use: Yes    Drug use: Never    Sexual activity: Not on file   Other Topics Concern    Not on file   Social History Narrative    Not on file     Social Drivers of Health     Financial Resource Strain: Not on file   Food Insecurity: Not on file   Transportation Needs: Not on file   Physical Activity: Not on file   Stress: Not on file   Social Connections: Not on file   Intimate Partner Violence: Not on file   Housing Stability: Not on file        reports current alcohol use.    reports that he has never smoked. He has never been exposed to tobacco smoke. He has never used smokeless tobacco.    reports no history of drug use.           Allergies  Patient has no known allergies.      Physical Exam  Visit Vitals  /82   Pulse 63   Temp 36.4 °C (97.5 °F)   Resp 16   Ht 1.753 m (5' 9\")   Wt 84.8 kg (187 lb)   BMI 27.62 kg/m²   Smoking Status Never   BSA 2.03 m²     Body mass index is 27.62 kg/m².    Physical Exam  Vitals and nursing note reviewed.   Constitutional:       General: He is not in acute distress.     Appearance: Normal appearance.   HENT:      Head: Normocephalic and atraumatic.      Right Ear: Tympanic membrane, ear canal and external ear " normal.      Left Ear: Tympanic membrane, ear canal and external ear normal.      Nose: Nose normal.      Mouth/Throat:      Mouth: Mucous membranes are moist.      Pharynx: Oropharynx is clear.   Eyes:      Extraocular Movements: Extraocular movements intact.      Conjunctiva/sclera: Conjunctivae normal.      Pupils: Pupils are equal, round, and reactive to light.   Cardiovascular:      Rate and Rhythm: Normal rate and regular rhythm.      Pulses: Normal pulses.      Heart sounds: Normal heart sounds. No murmur heard.     No friction rub. No gallop.   Pulmonary:      Effort: Pulmonary effort is normal. No respiratory distress.      Breath sounds: Normal breath sounds.   Abdominal:      General: Abdomen is flat. Bowel sounds are normal. There is no distension.      Palpations: Abdomen is soft.      Tenderness: There is no abdominal tenderness.   Musculoskeletal:         General: Normal range of motion.      Cervical back: Normal range of motion and neck supple.   Lymphadenopathy:      Cervical: No cervical adenopathy.   Skin:     General: Skin is warm and dry.      Findings: No lesion or rash.   Neurological:      General: No focal deficit present.      Mental Status: He is alert. Mental status is at baseline.   Psychiatric:         Mood and Affect: Mood normal.         Behavior: Behavior normal.         Thought Content: Thought content normal.         Judgment: Judgment normal.                 Assessment      Assessment & Plan  Encounter for preventive health examination  Recommend regular exercise, balanced diet, regular dental exams, and healthy habits.  Appropriate labs ordered or reviewed.  I recommend to eat plenty of plant foods (such as whole-grain products, fruits, and vegetables) and a moderate amount of lean and low-fat, animal-based food (meat and dairy products).  When shopping, choose lean meats, fish, and poultry. I recommend to continue regular aerobic exercise.  I recommend a yearly flu shot in the  fall and I recommend a yearly wellness exam.  He will receive Prevnar 20 today.      Orders:    Lipid Panel; Future    Comprehensive Metabolic Panel; Future    Hemoglobin A1C; Future    CBC; Future    Prostate cancer screening    Orders:    Prostate Specific Antigen; Future           Anticipatory Guidance     Eat well: Eat a balanced diet that includes lots of fruits and vegetables, whole grains, nuts, seeds, and legumes. Limit processed foods, sugar, saturated fat, and salt. Aim to eat at least five servings of fruits and vegetables per day, and no more than 1 teaspoon of salt.    Exercise: Try to exercise at least 30 minutes most days of the week.    Sleep: Aim to get 7-9 hours of sleep each night. Establish a bedtime routine and create a sleep-friendly environment.    Stay hydrated: Drink water and limit sugary beverages.    Reduce sitting time: Be mindful of your screen time.    Keep company with good people:  Set limits and boundaries.  Be selective.    Manage stress: Take breaks from the news, talk with someone you trust.    Other tips: Avoid drugs, tobacco and vaping.  Maintain a healthy weight, get regular health checkups, and limit alcohol              Orders Placed This Encounter   Procedures    Pneumococcal conjugate vaccine, 20-valent (PREVNAR 20)    Prostate Specific Antigen    Lipid Panel    Comprehensive Metabolic Panel    Hemoglobin A1C    CBC        No orders of the defined types were placed in this encounter.            [1]   Past Surgical History:  Procedure Laterality Date    HIP ARTHROPLASTY Left 12/02/2024    MENISCECTOMY  9/9/2021    VASECTOMY  11/1/2009

## 2025-06-10 ENCOUNTER — OFFICE VISIT (OUTPATIENT)
Dept: ORTHOPEDIC SURGERY | Facility: CLINIC | Age: 55
End: 2025-06-10
Payer: COMMERCIAL

## 2025-06-10 ENCOUNTER — HOSPITAL ENCOUNTER (OUTPATIENT)
Dept: RADIOLOGY | Facility: CLINIC | Age: 55
Discharge: HOME | End: 2025-06-10
Payer: COMMERCIAL

## 2025-06-10 DIAGNOSIS — Z96.642 STATUS POST LEFT HIP REPLACEMENT: ICD-10-CM

## 2025-06-10 DIAGNOSIS — M16.12 PRIMARY OSTEOARTHRITIS OF LEFT HIP: Primary | ICD-10-CM

## 2025-06-10 DIAGNOSIS — M16.12 PRIMARY OSTEOARTHRITIS OF LEFT HIP: ICD-10-CM

## 2025-06-10 PROCEDURE — 99212 OFFICE O/P EST SF 10 MIN: CPT | Performed by: ORTHOPAEDIC SURGERY

## 2025-06-10 PROCEDURE — 73502 X-RAY EXAM HIP UNI 2-3 VIEWS: CPT | Mod: LEFT SIDE | Performed by: ORTHOPAEDIC SURGERY

## 2025-06-10 PROCEDURE — 73502 X-RAY EXAM HIP UNI 2-3 VIEWS: CPT | Mod: LT

## 2025-06-10 PROCEDURE — 99213 OFFICE O/P EST LOW 20 MIN: CPT | Performed by: ORTHOPAEDIC SURGERY

## 2025-06-10 NOTE — PROGRESS NOTES
Subjective    Patient ID: Keith    Chief Complaint:   Chief Complaint   Patient presents with    Left Hip - Follow-up     LT THR 12/2/24, 6 months out, with x-rays today     History of present illness    54-year-old gentleman presented clinic today for his 6-month postop visit status post left total hip replacement.  Overall doing very well at this time.  He is back to mountain biking and working out at the gym regularly which seems to be helping with overall strength and endurance.  He seems very pleased with the outcome of left total hip replacement.  Still gets mild twinges over the posterior lateral left buttock and over the greater troches region.  No pain over the hip flexor.      Past medical , Surgical, Family and social history reviewed.      Physical exam  General: No acute distress and breathing comfortably.  Patient is pleasant and cooperative with the examination.    Extremity  Left hip is neurovascular intact.  Good range of motion.  Mild tightness over the posterior lateral greater troches region/glute.  No instability.  No sign of infection.  Compartment soft.  Capillary refill within normal is distally.  No tension over the hip flexor.    Diagnostics  [ none]  Imaging  No results found.    Cardiology, Vascular, and Other Imaging  XR hip left with pelvis when performed 2 or 3 views  Result Date: 6/10/2025  Interpreted By:  Meagan Dailey, STUDY: XR HIP LEFT WITH PELVIS WHEN PERFORMED 2 OR 3 VIEWS; 6/10/2025 8:12 am   INDICATION: Signs/Symptoms:s/p THR.   ACCESSION NUMBER(S): [id number]   ORDERING CLINICIAN: MEAGAN DAILEY   FINDINGS: Two views show patient status post total hip replacement in good alignment.  No signs of fracture dislocation or other bony abnormality       Signed by: Meagan Dailey 6/10/2025 8:20 AM Dictation workstation:   [id number]         Procedure  [ none]    Assessment  Status post left total hip replacement    Treatment plan  1.  At this time he will continue  with weightbearing activity as tolerated.  2.  Continue with his workouts at the gym we discussed more exercises to target the glutes  3.  He will follow-up with us in 6 months with new x-rays left hip at that time for his 1 year visit.  He will call if he needs medication for dental prophylaxis.  For complete plan and/or surgical details, please refer to Dr. Dailey's portion of the split/shared dictation.  4.  All of the patient's questions were answered.    Orders Placed This Encounter    XR hip left with pelvis when performed 2 or 3 views       This note was prepared using voice recognition software.  The details of this note are correct and have been reviewed, and corrected to the best of my ability.  Some grammatical areas may persist related to the Dragon software    Ruddy Boyer PA-C, [facility]  Or[facility] [place]    [phone]    In a face-to-face encounter performed today, I Chemo Dailey MD performed a history and physical examination, discussed pertinent diagnostic studies if indicated, and discussed diagnosis and management strategies with both the patient and the midlevel provider.  I reviewed the midlevel's note and agree with the documented findings and plan of care.  Greater than 50% of the evaluation and treatment decision was performed by the physician myself during today's visit.    54-year-old male 6 months out left total hip replacement surgery December 2 states doing very well gets a little bit of soreness posteriorly no groin pain noticing improvement on a daily basis no numbness or tingling no fevers or chills.  He is ambulating without assistive device.  On examination good range of motion the hip no pain with internal ex rotation neurologically intact distally brisk cap refill.  X-rays are obtained today see my dictated x-ray report.  Impression lisandra total hip replacement left.  Plan is continue work on range of motion strengthening continue to weight-bear as  tolerated follow-up 6 months with x-rays sooner if he has increased pain or discomfort.          This note was prepared using voice recognition software.  The details of this note are correct and have been reviewed, and corrected to the best of my ability.  Some grammatical areas may persist related to the Dragon software    Chemo Dailey MD  Senior Attending Physician  [facility]    [phone]

## 2026-06-12 ENCOUNTER — APPOINTMENT (OUTPATIENT)
Dept: PRIMARY CARE | Facility: CLINIC | Age: 56
End: 2026-06-12
Payer: COMMERCIAL